# Patient Record
Sex: FEMALE | Race: WHITE | Employment: FULL TIME | URBAN - METROPOLITAN AREA
[De-identification: names, ages, dates, MRNs, and addresses within clinical notes are randomized per-mention and may not be internally consistent; named-entity substitution may affect disease eponyms.]

---

## 2020-10-16 ENCOUNTER — APPOINTMENT (OUTPATIENT)
Dept: GENERAL RADIOLOGY | Age: 50
DRG: 512 | End: 2020-10-16
Payer: COMMERCIAL

## 2020-10-16 ENCOUNTER — HOSPITAL ENCOUNTER (INPATIENT)
Age: 50
LOS: 3 days | Discharge: HOME | DRG: 512 | End: 2020-10-19
Attending: EMERGENCY MEDICINE | Admitting: ORTHOPAEDIC SURGERY
Payer: COMMERCIAL

## 2020-10-16 PROBLEM — S52.201A FOREARM FRACTURES, BOTH BONES, CLOSED, RIGHT, INITIAL ENCOUNTER: Status: ACTIVE | Noted: 2020-10-16

## 2020-10-16 PROBLEM — S52.91XA FOREARM FRACTURES, BOTH BONES, CLOSED, RIGHT, INITIAL ENCOUNTER: Status: ACTIVE | Noted: 2020-10-16

## 2020-10-16 LAB
ACETAMINOPHEN LEVEL: <5 MCG/ML (ref 10–30)
AMPHETAMINE SCREEN, URINE: NOT DETECTED
BARBITURATE SCREEN URINE: NOT DETECTED
BASOPHILS ABSOLUTE: 0.04 E9/L (ref 0–0.2)
BASOPHILS RELATIVE PERCENT: 0.3 % (ref 0–2)
BENZODIAZEPINE SCREEN, URINE: NOT DETECTED
CANNABINOID SCREEN URINE: POSITIVE
COCAINE METABOLITE SCREEN URINE: NOT DETECTED
EOSINOPHILS ABSOLUTE: 0.07 E9/L (ref 0.05–0.5)
EOSINOPHILS RELATIVE PERCENT: 0.6 % (ref 0–6)
ETHANOL: 255 MG/DL (ref 0–0.08)
FENTANYL SCREEN, URINE: NOT DETECTED
HCT VFR BLD CALC: 42.9 % (ref 34–48)
HEMOGLOBIN: 14.3 G/DL (ref 11.5–15.5)
IMMATURE GRANULOCYTES #: 0.05 E9/L
IMMATURE GRANULOCYTES %: 0.4 % (ref 0–5)
LYMPHOCYTES ABSOLUTE: 2.01 E9/L (ref 1.5–4)
LYMPHOCYTES RELATIVE PERCENT: 16 % (ref 20–42)
Lab: ABNORMAL
MCH RBC QN AUTO: 28.9 PG (ref 26–35)
MCHC RBC AUTO-ENTMCNC: 33.3 % (ref 32–34.5)
MCV RBC AUTO: 86.8 FL (ref 80–99.9)
METHADONE SCREEN, URINE: NOT DETECTED
MONOCYTES ABSOLUTE: 0.67 E9/L (ref 0.1–0.95)
MONOCYTES RELATIVE PERCENT: 5.3 % (ref 2–12)
NEUTROPHILS ABSOLUTE: 9.74 E9/L (ref 1.8–7.3)
NEUTROPHILS RELATIVE PERCENT: 77.4 % (ref 43–80)
OPIATE SCREEN URINE: NOT DETECTED
OXYCODONE URINE: NOT DETECTED
PDW BLD-RTO: 12.9 FL (ref 11.5–15)
PHENCYCLIDINE SCREEN URINE: NOT DETECTED
PLATELET # BLD: 296 E9/L (ref 130–450)
PMV BLD AUTO: 10 FL (ref 7–12)
RBC # BLD: 4.94 E12/L (ref 3.5–5.5)
REASON FOR REJECTION: NORMAL
REJECTED TEST: NORMAL
SALICYLATE, SERUM: <0.3 MG/DL (ref 0–30)
TRICYCLIC ANTIDEPRESSANTS SCREEN SERUM: NEGATIVE NG/ML
WBC # BLD: 12.6 E9/L (ref 4.5–11.5)

## 2020-10-16 PROCEDURE — G0480 DRUG TEST DEF 1-7 CLASSES: HCPCS

## 2020-10-16 PROCEDURE — 1200000000 HC SEMI PRIVATE

## 2020-10-16 PROCEDURE — 99283 EMERGENCY DEPT VISIT LOW MDM: CPT

## 2020-10-16 PROCEDURE — 73090 X-RAY EXAM OF FOREARM: CPT

## 2020-10-16 PROCEDURE — 99222 1ST HOSP IP/OBS MODERATE 55: CPT | Performed by: ORTHOPAEDIC SURGERY

## 2020-10-16 PROCEDURE — 2580000003 HC RX 258: Performed by: EMERGENCY MEDICINE

## 2020-10-16 PROCEDURE — 71045 X-RAY EXAM CHEST 1 VIEW: CPT

## 2020-10-16 PROCEDURE — 85025 COMPLETE CBC W/AUTO DIFF WBC: CPT

## 2020-10-16 PROCEDURE — 80307 DRUG TEST PRSMV CHEM ANLYZR: CPT

## 2020-10-16 RX ORDER — 0.9 % SODIUM CHLORIDE 0.9 %
1000 INTRAVENOUS SOLUTION INTRAVENOUS ONCE
Status: COMPLETED | OUTPATIENT
Start: 2020-10-16 | End: 2020-10-17

## 2020-10-16 RX ADMIN — SODIUM CHLORIDE 1000 ML: 9 INJECTION, SOLUTION INTRAVENOUS at 21:48

## 2020-10-16 ASSESSMENT — PAIN DESCRIPTION - LOCATION: LOCATION: FINGER (COMMENT WHICH ONE)

## 2020-10-16 ASSESSMENT — PAIN DESCRIPTION - DESCRIPTORS: DESCRIPTORS: ACHING

## 2020-10-16 ASSESSMENT — PAIN DESCRIPTION - ORIENTATION: ORIENTATION: LEFT

## 2020-10-16 ASSESSMENT — PAIN SCALES - GENERAL: PAINLEVEL_OUTOF10: 6

## 2020-10-17 ENCOUNTER — APPOINTMENT (OUTPATIENT)
Dept: CT IMAGING | Age: 50
DRG: 512 | End: 2020-10-17
Payer: COMMERCIAL

## 2020-10-17 ENCOUNTER — APPOINTMENT (OUTPATIENT)
Dept: GENERAL RADIOLOGY | Age: 50
DRG: 512 | End: 2020-10-17
Payer: COMMERCIAL

## 2020-10-17 ENCOUNTER — ANESTHESIA (OUTPATIENT)
Dept: OPERATING ROOM | Age: 50
DRG: 512 | End: 2020-10-17
Payer: COMMERCIAL

## 2020-10-17 ENCOUNTER — ANESTHESIA EVENT (OUTPATIENT)
Dept: OPERATING ROOM | Age: 50
DRG: 512 | End: 2020-10-17
Payer: COMMERCIAL

## 2020-10-17 VITALS — TEMPERATURE: 97.5 F | DIASTOLIC BLOOD PRESSURE: 63 MMHG | SYSTOLIC BLOOD PRESSURE: 114 MMHG | OXYGEN SATURATION: 100 %

## 2020-10-17 LAB
ABO/RH: NORMAL
ALBUMIN SERPL-MCNC: 4.5 G/DL (ref 3.5–5.2)
ALP BLD-CCNC: 32 U/L (ref 35–104)
ALT SERPL-CCNC: 12 U/L (ref 0–32)
ANION GAP SERPL CALCULATED.3IONS-SCNC: 14 MMOL/L (ref 7–16)
ANTIBODY SCREEN: NORMAL
APTT: 29.4 SEC (ref 24.5–35.1)
AST SERPL-CCNC: 19 U/L (ref 0–31)
BILIRUB SERPL-MCNC: <0.2 MG/DL (ref 0–1.2)
BUN BLDV-MCNC: 12 MG/DL (ref 6–20)
CALCIUM SERPL-MCNC: 8.3 MG/DL (ref 8.6–10.2)
CHLORIDE BLD-SCNC: 110 MMOL/L (ref 98–107)
CO2: 20 MMOL/L (ref 22–29)
CREAT SERPL-MCNC: 0.6 MG/DL (ref 0.5–1)
EKG ATRIAL RATE: 69 BPM
EKG P AXIS: 72 DEGREES
EKG P-R INTERVAL: 154 MS
EKG Q-T INTERVAL: 434 MS
EKG QRS DURATION: 84 MS
EKG QTC CALCULATION (BAZETT): 465 MS
EKG R AXIS: 59 DEGREES
EKG T AXIS: 66 DEGREES
EKG VENTRICULAR RATE: 69 BPM
GFR AFRICAN AMERICAN: >60
GFR NON-AFRICAN AMERICAN: >60 ML/MIN/1.73
GLUCOSE BLD-MCNC: 102 MG/DL (ref 74–99)
HCT VFR BLD CALC: 38.6 % (ref 34–48)
HEMOGLOBIN: 12.8 G/DL (ref 11.5–15.5)
INR BLD: 1.1
POTASSIUM SERPL-SCNC: 3.5 MMOL/L (ref 3.5–5)
PROTHROMBIN TIME: 11.8 SEC (ref 9.3–12.4)
SODIUM BLD-SCNC: 144 MMOL/L (ref 132–146)
TOTAL PROTEIN: 7 G/DL (ref 6.4–8.3)

## 2020-10-17 PROCEDURE — 93005 ELECTROCARDIOGRAM TRACING: CPT | Performed by: STUDENT IN AN ORGANIZED HEALTH CARE EDUCATION/TRAINING PROGRAM

## 2020-10-17 PROCEDURE — 71045 X-RAY EXAM CHEST 1 VIEW: CPT

## 2020-10-17 PROCEDURE — 6360000002 HC RX W HCPCS: Performed by: NURSE ANESTHETIST, CERTIFIED REGISTERED

## 2020-10-17 PROCEDURE — 73090 X-RAY EXAM OF FOREARM: CPT

## 2020-10-17 PROCEDURE — 6360000002 HC RX W HCPCS: Performed by: FAMILY MEDICINE

## 2020-10-17 PROCEDURE — 86900 BLOOD TYPING SEROLOGIC ABO: CPT

## 2020-10-17 PROCEDURE — C1713 ANCHOR/SCREW BN/BN,TIS/BN: HCPCS | Performed by: ORTHOPAEDIC SURGERY

## 2020-10-17 PROCEDURE — 93010 ELECTROCARDIOGRAM REPORT: CPT | Performed by: INTERNAL MEDICINE

## 2020-10-17 PROCEDURE — 3600000004 HC SURGERY LEVEL 4 BASE: Performed by: ORTHOPAEDIC SURGERY

## 2020-10-17 PROCEDURE — 85014 HEMATOCRIT: CPT

## 2020-10-17 PROCEDURE — 36415 COLL VENOUS BLD VENIPUNCTURE: CPT

## 2020-10-17 PROCEDURE — 0PSH04Z REPOSITION RIGHT RADIUS WITH INTERNAL FIXATION DEVICE, OPEN APPROACH: ICD-10-PCS | Performed by: ORTHOPAEDIC SURGERY

## 2020-10-17 PROCEDURE — 3700000001 HC ADD 15 MINUTES (ANESTHESIA): Performed by: ORTHOPAEDIC SURGERY

## 2020-10-17 PROCEDURE — 2709999900 HC NON-CHARGEABLE SUPPLY: Performed by: ORTHOPAEDIC SURGERY

## 2020-10-17 PROCEDURE — 86901 BLOOD TYPING SEROLOGIC RH(D): CPT

## 2020-10-17 PROCEDURE — 2580000003 HC RX 258: Performed by: STUDENT IN AN ORGANIZED HEALTH CARE EDUCATION/TRAINING PROGRAM

## 2020-10-17 PROCEDURE — 72125 CT NECK SPINE W/O DYE: CPT

## 2020-10-17 PROCEDURE — 85610 PROTHROMBIN TIME: CPT

## 2020-10-17 PROCEDURE — 0PSK04Z REPOSITION RIGHT ULNA WITH INTERNAL FIXATION DEVICE, OPEN APPROACH: ICD-10-PCS | Performed by: ORTHOPAEDIC SURGERY

## 2020-10-17 PROCEDURE — 80053 COMPREHEN METABOLIC PANEL: CPT

## 2020-10-17 PROCEDURE — 7100000000 HC PACU RECOVERY - FIRST 15 MIN: Performed by: ORTHOPAEDIC SURGERY

## 2020-10-17 PROCEDURE — 6360000002 HC RX W HCPCS

## 2020-10-17 PROCEDURE — 3700000000 HC ANESTHESIA ATTENDED CARE: Performed by: ORTHOPAEDIC SURGERY

## 2020-10-17 PROCEDURE — 3209999900 FLUORO FOR SURGICAL PROCEDURES

## 2020-10-17 PROCEDURE — 86850 RBC ANTIBODY SCREEN: CPT

## 2020-10-17 PROCEDURE — 6360000002 HC RX W HCPCS: Performed by: ANESTHESIOLOGY

## 2020-10-17 PROCEDURE — 2720000010 HC SURG SUPPLY STERILE: Performed by: ORTHOPAEDIC SURGERY

## 2020-10-17 PROCEDURE — 70450 CT HEAD/BRAIN W/O DYE: CPT

## 2020-10-17 PROCEDURE — 85018 HEMOGLOBIN: CPT

## 2020-10-17 PROCEDURE — 6360000002 HC RX W HCPCS: Performed by: STUDENT IN AN ORGANIZED HEALTH CARE EDUCATION/TRAINING PROGRAM

## 2020-10-17 PROCEDURE — 2580000003 HC RX 258

## 2020-10-17 PROCEDURE — 7100000001 HC PACU RECOVERY - ADDTL 15 MIN: Performed by: ORTHOPAEDIC SURGERY

## 2020-10-17 PROCEDURE — 6370000000 HC RX 637 (ALT 250 FOR IP): Performed by: STUDENT IN AN ORGANIZED HEALTH CARE EDUCATION/TRAINING PROGRAM

## 2020-10-17 PROCEDURE — 2500000003 HC RX 250 WO HCPCS

## 2020-10-17 PROCEDURE — 3600000014 HC SURGERY LEVEL 4 ADDTL 15MIN: Performed by: ORTHOPAEDIC SURGERY

## 2020-10-17 PROCEDURE — 25575 OPTX RDL&ULN SHFT FX RDS&ULN: CPT | Performed by: ORTHOPAEDIC SURGERY

## 2020-10-17 PROCEDURE — 1200000000 HC SEMI PRIVATE

## 2020-10-17 PROCEDURE — 6360000002 HC RX W HCPCS: Performed by: EMERGENCY MEDICINE

## 2020-10-17 PROCEDURE — 85730 THROMBOPLASTIN TIME PARTIAL: CPT

## 2020-10-17 PROCEDURE — 6370000000 HC RX 637 (ALT 250 FOR IP): Performed by: ORTHOPAEDIC SURGERY

## 2020-10-17 DEVICE — SCREW BNE L14MM DIA2.7MM CORT S STL ST T8 STARDRV RECESS: Type: IMPLANTABLE DEVICE | Site: ULNA | Status: FUNCTIONAL

## 2020-10-17 DEVICE — SCREW BNE L16MM DIA2.7MM CORT S STL ST T8 STARDRV RECESS: Type: IMPLANTABLE DEVICE | Site: ULNA | Status: FUNCTIONAL

## 2020-10-17 DEVICE — PLATE BNE L97MM 12 H BILAT S STL LOK COMPR LO PROF FOR 2MM: Type: IMPLANTABLE DEVICE | Site: ULNA | Status: FUNCTIONAL

## 2020-10-17 DEVICE — SCREW BNE L14MM DIA3.5MM CORT S STL ST NONCANNULATED LOK: Type: IMPLANTABLE DEVICE | Site: RADIUS | Status: FUNCTIONAL

## 2020-10-17 DEVICE — SCREW BNE L14MM DIA2MM CORT S STL ST LOK FULL THRD T6: Type: IMPLANTABLE DEVICE | Site: ULNA | Status: FUNCTIONAL

## 2020-10-17 DEVICE — SCREW BNE L16MM DIA3.5MM CORT S STL ST NONCANNULATED LOK: Type: IMPLANTABLE DEVICE | Site: RADIUS | Status: FUNCTIONAL

## 2020-10-17 DEVICE — SCREW BNE L18MM DIA2MM CORT S STL ST LOK FULL THRD T6: Type: IMPLANTABLE DEVICE | Site: ULNA | Status: FUNCTIONAL

## 2020-10-17 DEVICE — PLATE BNE L137MM THK3.4MM 10 H BILAT S STL STR LOK COMPR: Type: IMPLANTABLE DEVICE | Site: RADIUS | Status: FUNCTIONAL

## 2020-10-17 DEVICE — SCREW BNE L20MM DIA3.5MM CORT S STL ST NONCANNULATED LOK: Type: IMPLANTABLE DEVICE | Site: RADIUS | Status: FUNCTIONAL

## 2020-10-17 DEVICE — SCREW CORTX SLFTP W/ 2.0X12MM: Type: IMPLANTABLE DEVICE | Status: FUNCTIONAL

## 2020-10-17 RX ORDER — MORPHINE SULFATE 2 MG/ML
1 INJECTION, SOLUTION INTRAMUSCULAR; INTRAVENOUS EVERY 5 MIN PRN
Status: DISCONTINUED | OUTPATIENT
Start: 2020-10-17 | End: 2020-10-17

## 2020-10-17 RX ORDER — DIAPER,BRIEF,INFANT-TODD,DISP
EACH MISCELLANEOUS PRN
Status: DISCONTINUED | OUTPATIENT
Start: 2020-10-17 | End: 2020-10-17 | Stop reason: ALTCHOICE

## 2020-10-17 RX ORDER — KETOROLAC TROMETHAMINE 30 MG/ML
INJECTION, SOLUTION INTRAMUSCULAR; INTRAVENOUS PRN
Status: DISCONTINUED | OUTPATIENT
Start: 2020-10-17 | End: 2020-10-17 | Stop reason: SDUPTHER

## 2020-10-17 RX ORDER — ONDANSETRON 2 MG/ML
4 INJECTION INTRAMUSCULAR; INTRAVENOUS EVERY 6 HOURS PRN
Status: DISCONTINUED | OUTPATIENT
Start: 2020-10-17 | End: 2020-10-19 | Stop reason: HOSPADM

## 2020-10-17 RX ORDER — ROCURONIUM BROMIDE 10 MG/ML
INJECTION, SOLUTION INTRAVENOUS PRN
Status: DISCONTINUED | OUTPATIENT
Start: 2020-10-17 | End: 2020-10-17 | Stop reason: SDUPTHER

## 2020-10-17 RX ORDER — LABETALOL HYDROCHLORIDE 5 MG/ML
INJECTION, SOLUTION INTRAVENOUS PRN
Status: DISCONTINUED | OUTPATIENT
Start: 2020-10-17 | End: 2020-10-17 | Stop reason: SDUPTHER

## 2020-10-17 RX ORDER — SODIUM CHLORIDE 9 MG/ML
INJECTION, SOLUTION INTRAVENOUS CONTINUOUS
Status: DISCONTINUED | OUTPATIENT
Start: 2020-10-17 | End: 2020-10-19 | Stop reason: HOSPADM

## 2020-10-17 RX ORDER — MORPHINE SULFATE 2 MG/ML
2 INJECTION, SOLUTION INTRAMUSCULAR; INTRAVENOUS ONCE
Status: COMPLETED | OUTPATIENT
Start: 2020-10-17 | End: 2020-10-17

## 2020-10-17 RX ORDER — SODIUM CHLORIDE, SODIUM LACTATE, POTASSIUM CHLORIDE, CALCIUM CHLORIDE 600; 310; 30; 20 MG/100ML; MG/100ML; MG/100ML; MG/100ML
INJECTION, SOLUTION INTRAVENOUS CONTINUOUS PRN
Status: DISCONTINUED | OUTPATIENT
Start: 2020-10-17 | End: 2020-10-17 | Stop reason: SDUPTHER

## 2020-10-17 RX ORDER — BUPRENORPHINE AND NALOXONE 8; 2 MG/1; MG/1
1 FILM, SOLUBLE BUCCAL; SUBLINGUAL 2 TIMES DAILY
Status: DISCONTINUED | OUTPATIENT
Start: 2020-10-17 | End: 2020-10-17 | Stop reason: ALTCHOICE

## 2020-10-17 RX ORDER — SODIUM CHLORIDE 9 MG/ML
INJECTION, SOLUTION INTRAVENOUS CONTINUOUS PRN
Status: DISCONTINUED | OUTPATIENT
Start: 2020-10-17 | End: 2020-10-17 | Stop reason: SDUPTHER

## 2020-10-17 RX ORDER — SODIUM CHLORIDE 0.9 % (FLUSH) 0.9 %
10 SYRINGE (ML) INJECTION PRN
Status: DISCONTINUED | OUTPATIENT
Start: 2020-10-17 | End: 2020-10-19 | Stop reason: HOSPADM

## 2020-10-17 RX ORDER — POLYETHYLENE GLYCOL 3350 17 G/17G
17 POWDER, FOR SOLUTION ORAL DAILY PRN
Status: DISCONTINUED | OUTPATIENT
Start: 2020-10-17 | End: 2020-10-19 | Stop reason: HOSPADM

## 2020-10-17 RX ORDER — MORPHINE SULFATE 2 MG/ML
2 INJECTION, SOLUTION INTRAMUSCULAR; INTRAVENOUS EVERY 4 HOURS PRN
Status: DISCONTINUED | OUTPATIENT
Start: 2020-10-17 | End: 2020-10-17 | Stop reason: SDUPTHER

## 2020-10-17 RX ORDER — MORPHINE SULFATE 2 MG/ML
2 INJECTION, SOLUTION INTRAMUSCULAR; INTRAVENOUS EVERY 4 HOURS PRN
Status: DISCONTINUED | OUTPATIENT
Start: 2020-10-17 | End: 2020-10-19 | Stop reason: HOSPADM

## 2020-10-17 RX ORDER — HYDRALAZINE HYDROCHLORIDE 20 MG/ML
INJECTION INTRAMUSCULAR; INTRAVENOUS PRN
Status: DISCONTINUED | OUTPATIENT
Start: 2020-10-17 | End: 2020-10-17 | Stop reason: SDUPTHER

## 2020-10-17 RX ORDER — LIDOCAINE HYDROCHLORIDE 20 MG/ML
INJECTION, SOLUTION INTRAVENOUS PRN
Status: DISCONTINUED | OUTPATIENT
Start: 2020-10-17 | End: 2020-10-17 | Stop reason: SDUPTHER

## 2020-10-17 RX ORDER — PROPOFOL 10 MG/ML
INJECTION, EMULSION INTRAVENOUS PRN
Status: DISCONTINUED | OUTPATIENT
Start: 2020-10-17 | End: 2020-10-17 | Stop reason: SDUPTHER

## 2020-10-17 RX ORDER — NEOSTIGMINE METHYLSULFATE 1 MG/ML
INJECTION, SOLUTION INTRAVENOUS PRN
Status: DISCONTINUED | OUTPATIENT
Start: 2020-10-17 | End: 2020-10-17 | Stop reason: SDUPTHER

## 2020-10-17 RX ORDER — DEXAMETHASONE SODIUM PHOSPHATE 10 MG/ML
INJECTION, SOLUTION INTRAMUSCULAR; INTRAVENOUS PRN
Status: DISCONTINUED | OUTPATIENT
Start: 2020-10-17 | End: 2020-10-17 | Stop reason: SDUPTHER

## 2020-10-17 RX ORDER — ONDANSETRON 2 MG/ML
INJECTION INTRAMUSCULAR; INTRAVENOUS PRN
Status: DISCONTINUED | OUTPATIENT
Start: 2020-10-17 | End: 2020-10-17 | Stop reason: SDUPTHER

## 2020-10-17 RX ORDER — OXYCODONE HYDROCHLORIDE AND ACETAMINOPHEN 5; 325 MG/1; MG/1
1 TABLET ORAL EVERY 4 HOURS PRN
Status: DISCONTINUED | OUTPATIENT
Start: 2020-10-17 | End: 2020-10-19 | Stop reason: HOSPADM

## 2020-10-17 RX ORDER — MORPHINE SULFATE 4 MG/ML
4 INJECTION, SOLUTION INTRAMUSCULAR; INTRAVENOUS EVERY 4 HOURS PRN
Status: DISCONTINUED | OUTPATIENT
Start: 2020-10-17 | End: 2020-10-19 | Stop reason: HOSPADM

## 2020-10-17 RX ORDER — ACETAMINOPHEN 325 MG/1
650 TABLET ORAL EVERY 6 HOURS
Status: DISCONTINUED | OUTPATIENT
Start: 2020-10-17 | End: 2020-10-19 | Stop reason: HOSPADM

## 2020-10-17 RX ORDER — ONDANSETRON 2 MG/ML
4 INJECTION INTRAMUSCULAR; INTRAVENOUS ONCE
Status: COMPLETED | OUTPATIENT
Start: 2020-10-17 | End: 2020-10-17

## 2020-10-17 RX ORDER — TRAMADOL HYDROCHLORIDE 50 MG/1
50 TABLET ORAL EVERY 6 HOURS PRN
Status: DISCONTINUED | OUTPATIENT
Start: 2020-10-17 | End: 2020-10-17

## 2020-10-17 RX ORDER — MIDAZOLAM HYDROCHLORIDE 1 MG/ML
INJECTION INTRAMUSCULAR; INTRAVENOUS PRN
Status: DISCONTINUED | OUTPATIENT
Start: 2020-10-17 | End: 2020-10-17 | Stop reason: SDUPTHER

## 2020-10-17 RX ORDER — HYDROCODONE BITARTRATE AND ACETAMINOPHEN 5; 325 MG/1; MG/1
1 TABLET ORAL PRN
Status: DISCONTINUED | OUTPATIENT
Start: 2020-10-17 | End: 2020-10-17

## 2020-10-17 RX ORDER — GLYCOPYRROLATE 1 MG/5 ML
SYRINGE (ML) INTRAVENOUS PRN
Status: DISCONTINUED | OUTPATIENT
Start: 2020-10-17 | End: 2020-10-17 | Stop reason: SDUPTHER

## 2020-10-17 RX ORDER — PROMETHAZINE HYDROCHLORIDE 25 MG/ML
6.25 INJECTION, SOLUTION INTRAMUSCULAR; INTRAVENOUS EVERY 10 MIN PRN
Status: DISCONTINUED | OUTPATIENT
Start: 2020-10-17 | End: 2020-10-17

## 2020-10-17 RX ORDER — DIAZEPAM 5 MG/1
10 TABLET ORAL PRN
Status: DISCONTINUED | OUTPATIENT
Start: 2020-10-17 | End: 2020-10-17 | Stop reason: ALTCHOICE

## 2020-10-17 RX ORDER — OXYCODONE HYDROCHLORIDE AND ACETAMINOPHEN 5; 325 MG/1; MG/1
1 TABLET ORAL EVERY 6 HOURS PRN
Qty: 28 TABLET | Refills: 0 | Status: SHIPPED | OUTPATIENT
Start: 2020-10-17 | End: 2020-10-24

## 2020-10-17 RX ORDER — SODIUM CHLORIDE 0.9 % (FLUSH) 0.9 %
10 SYRINGE (ML) INJECTION PRN
Status: DISCONTINUED | OUTPATIENT
Start: 2020-10-17 | End: 2020-10-17 | Stop reason: SDUPTHER

## 2020-10-17 RX ORDER — MORPHINE SULFATE 2 MG/ML
2 INJECTION, SOLUTION INTRAMUSCULAR; INTRAVENOUS EVERY 5 MIN PRN
Status: DISCONTINUED | OUTPATIENT
Start: 2020-10-17 | End: 2020-10-17

## 2020-10-17 RX ORDER — SODIUM CHLORIDE 0.9 % (FLUSH) 0.9 %
10 SYRINGE (ML) INJECTION EVERY 12 HOURS SCHEDULED
Status: DISCONTINUED | OUTPATIENT
Start: 2020-10-17 | End: 2020-10-19 | Stop reason: HOSPADM

## 2020-10-17 RX ORDER — FENTANYL CITRATE 50 UG/ML
INJECTION, SOLUTION INTRAMUSCULAR; INTRAVENOUS PRN
Status: DISCONTINUED | OUTPATIENT
Start: 2020-10-17 | End: 2020-10-17 | Stop reason: SDUPTHER

## 2020-10-17 RX ORDER — HYDROCODONE BITARTRATE AND ACETAMINOPHEN 5; 325 MG/1; MG/1
2 TABLET ORAL PRN
Status: DISCONTINUED | OUTPATIENT
Start: 2020-10-17 | End: 2020-10-17

## 2020-10-17 RX ORDER — MEPERIDINE HYDROCHLORIDE 25 MG/ML
12.5 INJECTION INTRAMUSCULAR; INTRAVENOUS; SUBCUTANEOUS EVERY 5 MIN PRN
Status: DISCONTINUED | OUTPATIENT
Start: 2020-10-17 | End: 2020-10-17

## 2020-10-17 RX ORDER — PROMETHAZINE HYDROCHLORIDE 25 MG/1
12.5 TABLET ORAL EVERY 6 HOURS PRN
Status: DISCONTINUED | OUTPATIENT
Start: 2020-10-17 | End: 2020-10-19 | Stop reason: HOSPADM

## 2020-10-17 RX ORDER — SODIUM CHLORIDE 0.9 % (FLUSH) 0.9 %
10 SYRINGE (ML) INJECTION EVERY 12 HOURS SCHEDULED
Status: DISCONTINUED | OUTPATIENT
Start: 2020-10-17 | End: 2020-10-17 | Stop reason: SDUPTHER

## 2020-10-17 RX ORDER — TRAMADOL HYDROCHLORIDE 50 MG/1
50 TABLET ORAL EVERY 6 HOURS PRN
Status: DISCONTINUED | OUTPATIENT
Start: 2020-10-17 | End: 2020-10-19 | Stop reason: HOSPADM

## 2020-10-17 RX ADMIN — MORPHINE SULFATE 4 MG: 4 INJECTION, SOLUTION INTRAMUSCULAR; INTRAVENOUS at 19:12

## 2020-10-17 RX ADMIN — MORPHINE SULFATE 2 MG: 2 INJECTION, SOLUTION INTRAMUSCULAR; INTRAVENOUS at 08:10

## 2020-10-17 RX ADMIN — SODIUM CHLORIDE, PRESERVATIVE FREE 10 ML: 5 INJECTION INTRAVENOUS at 08:11

## 2020-10-17 RX ADMIN — ONDANSETRON HYDROCHLORIDE 4 MG: 2 INJECTION, SOLUTION INTRAMUSCULAR; INTRAVENOUS at 16:49

## 2020-10-17 RX ADMIN — DEXAMETHASONE SODIUM PHOSPHATE 10 MG: 10 INJECTION, SOLUTION INTRAMUSCULAR; INTRAVENOUS at 15:32

## 2020-10-17 RX ADMIN — ONDANSETRON 4 MG: 2 INJECTION INTRAMUSCULAR; INTRAVENOUS at 00:28

## 2020-10-17 RX ADMIN — FENTANYL CITRATE 50 MCG: 50 INJECTION, SOLUTION INTRAMUSCULAR; INTRAVENOUS at 17:05

## 2020-10-17 RX ADMIN — MORPHINE SULFATE 2 MG: 2 INJECTION, SOLUTION INTRAMUSCULAR; INTRAVENOUS at 00:28

## 2020-10-17 RX ADMIN — ROCURONIUM BROMIDE 10 MG: 10 INJECTION, SOLUTION INTRAVENOUS at 16:06

## 2020-10-17 RX ADMIN — FENTANYL CITRATE 50 MCG: 50 INJECTION, SOLUTION INTRAMUSCULAR; INTRAVENOUS at 16:00

## 2020-10-17 RX ADMIN — FENTANYL CITRATE 100 MCG: 50 INJECTION, SOLUTION INTRAMUSCULAR; INTRAVENOUS at 15:32

## 2020-10-17 RX ADMIN — SODIUM CHLORIDE, POTASSIUM CHLORIDE, SODIUM LACTATE AND CALCIUM CHLORIDE: 600; 310; 30; 20 INJECTION, SOLUTION INTRAVENOUS at 16:31

## 2020-10-17 RX ADMIN — HYDROMORPHONE HYDROCHLORIDE 0.5 MG: 1 INJECTION, SOLUTION INTRAMUSCULAR; INTRAVENOUS; SUBCUTANEOUS at 18:14

## 2020-10-17 RX ADMIN — Medication 2 G: at 15:36

## 2020-10-17 RX ADMIN — KETOROLAC TROMETHAMINE 30 MG: 30 INJECTION, SOLUTION INTRAMUSCULAR; INTRAVENOUS at 17:35

## 2020-10-17 RX ADMIN — OXYCODONE AND ACETAMINOPHEN 1 TABLET: 5; 325 TABLET ORAL at 21:36

## 2020-10-17 RX ADMIN — HYDRALAZINE HYDROCHLORIDE 5 MG: 20 INJECTION INTRAMUSCULAR; INTRAVENOUS at 16:50

## 2020-10-17 RX ADMIN — MORPHINE SULFATE 2 MG: 2 INJECTION, SOLUTION INTRAMUSCULAR; INTRAVENOUS at 12:30

## 2020-10-17 RX ADMIN — ROCURONIUM BROMIDE 40 MG: 10 INJECTION, SOLUTION INTRAVENOUS at 15:32

## 2020-10-17 RX ADMIN — TRAMADOL HYDROCHLORIDE 50 MG: 50 TABLET ORAL at 05:32

## 2020-10-17 RX ADMIN — Medication 0.6 MG: at 16:51

## 2020-10-17 RX ADMIN — SODIUM CHLORIDE: 9 INJECTION, SOLUTION INTRAVENOUS at 16:31

## 2020-10-17 RX ADMIN — SODIUM CHLORIDE: 9 INJECTION, SOLUTION INTRAVENOUS at 13:22

## 2020-10-17 RX ADMIN — FENTANYL CITRATE 50 MCG: 50 INJECTION, SOLUTION INTRAMUSCULAR; INTRAVENOUS at 15:27

## 2020-10-17 RX ADMIN — FENTANYL CITRATE 50 MCG: 50 INJECTION, SOLUTION INTRAMUSCULAR; INTRAVENOUS at 17:25

## 2020-10-17 RX ADMIN — LABETALOL HYDROCHLORIDE 5 MG: 5 INJECTION INTRAVENOUS at 16:52

## 2020-10-17 RX ADMIN — HYDROMORPHONE HYDROCHLORIDE 0.5 MG: 1 INJECTION, SOLUTION INTRAMUSCULAR; INTRAVENOUS; SUBCUTANEOUS at 18:08

## 2020-10-17 RX ADMIN — MIDAZOLAM 2 MG: 1 INJECTION INTRAMUSCULAR; INTRAVENOUS at 15:27

## 2020-10-17 RX ADMIN — Medication 10 ML: at 10:16

## 2020-10-17 RX ADMIN — Medication 3 MG: at 16:51

## 2020-10-17 RX ADMIN — FENTANYL CITRATE 50 MCG: 50 INJECTION, SOLUTION INTRAMUSCULAR; INTRAVENOUS at 16:16

## 2020-10-17 RX ADMIN — SODIUM CHLORIDE: 9 INJECTION, SOLUTION INTRAVENOUS at 15:27

## 2020-10-17 RX ADMIN — PROPOFOL 120 MG: 10 INJECTION, EMULSION INTRAVENOUS at 15:32

## 2020-10-17 RX ADMIN — LIDOCAINE HYDROCHLORIDE 100 MG: 20 INJECTION, SOLUTION INTRAVENOUS at 15:32

## 2020-10-17 RX ADMIN — Medication 2 G: at 23:37

## 2020-10-17 RX ADMIN — ACETAMINOPHEN 650 MG: 325 TABLET, FILM COATED ORAL at 19:12

## 2020-10-17 RX ADMIN — MORPHINE SULFATE 4 MG: 4 INJECTION, SOLUTION INTRAMUSCULAR; INTRAVENOUS at 23:19

## 2020-10-17 RX ADMIN — HYDRALAZINE HYDROCHLORIDE 5 MG: 20 INJECTION INTRAMUSCULAR; INTRAVENOUS at 16:30

## 2020-10-17 ASSESSMENT — PAIN DESCRIPTION - DESCRIPTORS
DESCRIPTORS: ACHING;CONSTANT;DISCOMFORT;THROBBING
DESCRIPTORS: CONSTANT;THROBBING;SHARP
DESCRIPTORS: ACHING;CONSTANT;DISCOMFORT;THROBBING
DESCRIPTORS: CRAMPING;DISCOMFORT
DESCRIPTORS: CONSTANT;SHARP;POUNDING
DESCRIPTORS: CRAMPING;DISCOMFORT

## 2020-10-17 ASSESSMENT — PAIN SCALES - GENERAL
PAINLEVEL_OUTOF10: 0
PAINLEVEL_OUTOF10: 5
PAINLEVEL_OUTOF10: 0
PAINLEVEL_OUTOF10: 10
PAINLEVEL_OUTOF10: 6
PAINLEVEL_OUTOF10: 9
PAINLEVEL_OUTOF10: 10
PAINLEVEL_OUTOF10: 0
PAINLEVEL_OUTOF10: 7
PAINLEVEL_OUTOF10: 9
PAINLEVEL_OUTOF10: 7
PAINLEVEL_OUTOF10: 0
PAINLEVEL_OUTOF10: 9
PAINLEVEL_OUTOF10: 7
PAINLEVEL_OUTOF10: 0
PAINLEVEL_OUTOF10: 0

## 2020-10-17 ASSESSMENT — PULMONARY FUNCTION TESTS
PIF_VALUE: 2
PIF_VALUE: 19
PIF_VALUE: 15
PIF_VALUE: 2
PIF_VALUE: 19
PIF_VALUE: 1
PIF_VALUE: 19
PIF_VALUE: 18
PIF_VALUE: 23
PIF_VALUE: 2
PIF_VALUE: 0
PIF_VALUE: 17
PIF_VALUE: 19
PIF_VALUE: 2
PIF_VALUE: 19
PIF_VALUE: 16
PIF_VALUE: 20
PIF_VALUE: 17
PIF_VALUE: 22
PIF_VALUE: 22
PIF_VALUE: 2
PIF_VALUE: 23
PIF_VALUE: 2
PIF_VALUE: 22
PIF_VALUE: 22
PIF_VALUE: 4
PIF_VALUE: 2
PIF_VALUE: 15
PIF_VALUE: 3
PIF_VALUE: 2
PIF_VALUE: 22
PIF_VALUE: 18
PIF_VALUE: 19
PIF_VALUE: 23
PIF_VALUE: 2
PIF_VALUE: 3
PIF_VALUE: 19
PIF_VALUE: 23
PIF_VALUE: 23
PIF_VALUE: 15
PIF_VALUE: 2
PIF_VALUE: 19
PIF_VALUE: 17
PIF_VALUE: 20
PIF_VALUE: 19
PIF_VALUE: 19
PIF_VALUE: 23
PIF_VALUE: 23
PIF_VALUE: 0
PIF_VALUE: 22
PIF_VALUE: 22
PIF_VALUE: 23
PIF_VALUE: 17
PIF_VALUE: 22
PIF_VALUE: 22
PIF_VALUE: 15
PIF_VALUE: 17
PIF_VALUE: 19
PIF_VALUE: 23
PIF_VALUE: 19
PIF_VALUE: 22
PIF_VALUE: 19
PIF_VALUE: 22
PIF_VALUE: 19
PIF_VALUE: 20
PIF_VALUE: 2
PIF_VALUE: 22
PIF_VALUE: 23
PIF_VALUE: 22
PIF_VALUE: 19
PIF_VALUE: 20
PIF_VALUE: 23
PIF_VALUE: 17
PIF_VALUE: 20
PIF_VALUE: 22
PIF_VALUE: 15
PIF_VALUE: 5
PIF_VALUE: 2
PIF_VALUE: 19
PIF_VALUE: 23
PIF_VALUE: 2
PIF_VALUE: 17
PIF_VALUE: 0
PIF_VALUE: 20
PIF_VALUE: 19
PIF_VALUE: 1
PIF_VALUE: 19
PIF_VALUE: 23
PIF_VALUE: 17
PIF_VALUE: 22
PIF_VALUE: 2
PIF_VALUE: 19
PIF_VALUE: 15
PIF_VALUE: 28
PIF_VALUE: 2
PIF_VALUE: 22
PIF_VALUE: 19
PIF_VALUE: 17
PIF_VALUE: 19
PIF_VALUE: 15
PIF_VALUE: 19
PIF_VALUE: 19
PIF_VALUE: 2
PIF_VALUE: 17
PIF_VALUE: 2
PIF_VALUE: 22
PIF_VALUE: 23
PIF_VALUE: 2
PIF_VALUE: 23
PIF_VALUE: 19
PIF_VALUE: 22
PIF_VALUE: 20
PIF_VALUE: 19
PIF_VALUE: 1
PIF_VALUE: 19
PIF_VALUE: 20
PIF_VALUE: 23
PIF_VALUE: 19
PIF_VALUE: 21
PIF_VALUE: 19
PIF_VALUE: 20
PIF_VALUE: 19
PIF_VALUE: 2
PIF_VALUE: 23
PIF_VALUE: 2
PIF_VALUE: 2
PIF_VALUE: 20
PIF_VALUE: 2
PIF_VALUE: 17
PIF_VALUE: 22
PIF_VALUE: 19
PIF_VALUE: 19
PIF_VALUE: 0

## 2020-10-17 ASSESSMENT — PAIN DESCRIPTION - PAIN TYPE
TYPE: ACUTE PAIN
TYPE: SURGICAL PAIN
TYPE: ACUTE PAIN
TYPE: SURGICAL PAIN

## 2020-10-17 ASSESSMENT — PAIN DESCRIPTION - FREQUENCY
FREQUENCY: CONTINUOUS

## 2020-10-17 ASSESSMENT — PAIN DESCRIPTION - ONSET
ONSET: ON-GOING
ONSET: ON-GOING

## 2020-10-17 ASSESSMENT — PAIN DESCRIPTION - ORIENTATION
ORIENTATION: RIGHT

## 2020-10-17 ASSESSMENT — PAIN DESCRIPTION - LOCATION
LOCATION: WRIST;ARM
LOCATION: ARM
LOCATION: WRIST;ARM
LOCATION: ARM

## 2020-10-17 ASSESSMENT — PAIN - FUNCTIONAL ASSESSMENT
PAIN_FUNCTIONAL_ASSESSMENT: PREVENTS OR INTERFERES SOME ACTIVE ACTIVITIES AND ADLS
PAIN_FUNCTIONAL_ASSESSMENT: PREVENTS OR INTERFERES WITH ALL ACTIVE AND SOME PASSIVE ACTIVITIES
PAIN_FUNCTIONAL_ASSESSMENT: PREVENTS OR INTERFERES SOME ACTIVE ACTIVITIES AND ADLS

## 2020-10-17 ASSESSMENT — PAIN DESCRIPTION - PROGRESSION: CLINICAL_PROGRESSION: NOT CHANGED

## 2020-10-17 NOTE — ANESTHESIA PRE PROCEDURE
mL/hr at 10/17/20 1322         Allergies:  No Known Allergies    Problem List:    Patient Active Problem List   Diagnosis Code    Forearm fractures, both bones, closed, right, initial encounter S52.91XA, S52.201A       Past Medical History:        Diagnosis Date    Arthritis     Bulging lumbar disc     Restless leg syndrome     Stress fracture        Past Surgical History:        Procedure Laterality Date    FOOT SURGERY      HYSTERECTOMY      LEEP      TONSILLECTOMY      TUBAL LIGATION         Social History:    Social History     Tobacco Use    Smoking status: Current Every Day Smoker     Packs/day: 1.00    Smokeless tobacco: Never Used   Substance Use Topics    Alcohol use: No                                Ready to quit: Not Answered  Counseling given: Not Answered      Vital Signs (Current):   Vitals:    10/17/20 0030 10/17/20 0114 10/17/20 0810 10/17/20 1015   BP:  134/75 130/70 138/76   Pulse:  70 70 68   Resp: 16 20 18 18   Temp: 36.6 °C (97.8 °F) 36.6 °C (97.8 °F) 36.7 °C (98.1 °F) 36.5 °C (97.7 °F)   TempSrc: Oral Temporal Temporal Temporal   SpO2: 96% 96% 96% 98%   Weight:  145 lb (65.8 kg)     Height:  5' 5\" (1.651 m)                                                BP Readings from Last 3 Encounters:   10/17/20 138/76   10/13/17 110/80   08/16/17 (!) 160/100       NPO Status:  > 8 hours                                                                               BMI:   Wt Readings from Last 3 Encounters:   10/17/20 145 lb (65.8 kg)   08/16/17 135 lb (61.2 kg)   12/22/16 140 lb (63.5 kg)     Body mass index is 24.13 kg/m².     CBC:   Lab Results   Component Value Date    WBC 12.6 10/16/2020    RBC 4.94 10/16/2020    HGB 12.8 10/17/2020    HCT 38.6 10/17/2020    MCV 86.8 10/16/2020    RDW 12.9 10/16/2020     10/16/2020       CMP:   Lab Results   Component Value Date     10/17/2020    K 3.5 10/17/2020     10/17/2020    CO2 20 10/17/2020    BUN 12 10/17/2020    CREATININE 0.6 10/17/2020    GFRAA >60 10/17/2020    LABGLOM >60 10/17/2020    GLUCOSE 102 10/17/2020    PROT 7.0 10/17/2020    CALCIUM 8.3 10/17/2020    BILITOT <0.2 10/17/2020    ALKPHOS 32 10/17/2020    AST 19 10/17/2020    ALT 12 10/17/2020       POC Tests: No results for input(s): POCGLU, POCNA, POCK, POCCL, POCBUN, POCHEMO, POCHCT in the last 72 hours. Coags:   Lab Results   Component Value Date    PROTIME 11.8 10/17/2020    INR 1.1 10/17/2020    APTT 29.4 10/17/2020       HCG (If Applicable):   Lab Results   Component Value Date    PREGTESTUR neg 03/13/2016        ABGs: No results found for: PHART, PO2ART, GQY5RVQ, TAN2KYK, BEART, K5TFLOYK     Type & Screen (If Applicable):  No results found for: LABABO, LABRH    Drug/Infectious Status (If Applicable):  No results found for: HIV, HEPCAB    COVID-19 Screening (If Applicable): No results found for: COVID19     EKG 10/17/2020  Narrative & Impression   Normal sinus rhythm  Normal ECG  When compared with ECG of 13-OCT-2017 15:50,  No significant change was found       Anesthesia Evaluation  Patient summary reviewed and Nursing notes reviewed no history of anesthetic complications:   Airway: Mallampati: II  TM distance: >3 FB   Neck ROM: full  Mouth opening: > = 3 FB Dental:      Comment: Pt denies loose or chipped teeth    Pulmonary:normal exam  breath sounds clear to auscultation                            ROS comment: Vapes occasionally   Cardiovascular:Negative CV ROS          ECG reviewed  Rhythm: regular  Rate: normal           Beta Blocker:  Not on Beta Blocker         Neuro/Psych:                ROS comment: Restless leg syndrome  GI/Hepatic/Renal: Neg GI/Hepatic/Renal ROS            Endo/Other:    (+) : arthritis:., .                 Abdominal:           Vascular: negative vascular ROS. Anesthesia Plan      general     ASA 2     (#20 Left Hand)  Induction: intravenous.   BIS  MIPS: Postoperative opioids intended and Prophylactic antiemetics administered. Anesthetic plan and risks discussed with patient. Use of blood products discussed with patient whom consented to blood products. Plan discussed with CRNA and attending. Otoniel Luna RN   10/17/2020      Pt seen, examined, chart reviewed, plan discussed.   Tha Lr  10/17/2020  3:39 PM

## 2020-10-17 NOTE — PROGRESS NOTES
Date: 10/17/2020       Patient Name: Evita Hahn  : 1970      MRN: [id number]    PT order received and chart reviewed. PT evaluation held due to OR.   Will follow up as appropriate    Lear Eriberto PT, DPT  [id number]

## 2020-10-17 NOTE — ANESTHESIA POSTPROCEDURE EVALUATION
Department of Anesthesiology  Postprocedure Note    Patient: Harlan Glover  MRN: [id number]  YOB: 1970  Date of evaluation: 10/17/2020  Time:  6:34 PM     Procedure Summary     Date:  10/17/20 Room / Location:  [place] [address] / [facility]    Anesthesia Start:  1527 Anesthesia Stop:      Procedure:  FOREARM OPEN REDUCTION INTERNAL FIXATION (Right ) Diagnosis:  (fracture ulna and radius)    Surgeon:  Charlette Estrada MD Responsible Provider:  Alexandr Murillo MD    Anesthesia Type:  general ASA Status:  2          Anesthesia Type: general    Marlon Phase I: Marlon Score: 8    Marlon Phase II:      Last vitals: Reviewed and per EMR flowsheets.        Anesthesia Post Evaluation    Patient location during evaluation: PACU  Patient participation: complete - patient participated  Level of consciousness: awake  Pain score: 3  Airway patency: patent  Nausea & Vomiting: no nausea and no vomiting  Complications: no  Cardiovascular status: blood pressure returned to baseline  Respiratory status: acceptable  Hydration status: euvolemic
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
## 2020-10-17 NOTE — PROGRESS NOTES
Floor Called, nurse to nurse given. Spoke with [employer] . Patients test results review, VS reported to receiving nurse. Any and all important information regarding patient disclosed. Patient transferred to floor on bed on 3lnc in stable condition with ancillary staff.

## 2020-10-17 NOTE — PROGRESS NOTES
Occupational Therapy          OT consult received and appreciated. Chart reviewed. Will hold evaluation due to OR scheduled today for R forearm ORIF . Will evaluate at a later time post op as indicated. Thank you.  Christi Hernandez, OTR/L #[id number]

## 2020-10-17 NOTE — PROGRESS NOTES
Dr. Te Long notified patient is in extreme pain and it is ok to use dilaudid as first line for pain control.
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
## 2020-10-17 NOTE — PROGRESS NOTES
Department of Orthopedic Surgery  Resident Progress Note    Patient seen and examined. Pain controlled. No new complaints. Answered all questions about surgery and post-operative plan.  Pt voiced understanding    VITALS:  /75   Pulse 70   Temp 97.8 °F (36.6 °C) (Temporal)   Resp 20   Ht 5' 5\" (1.651 m)   Wt 145 lb (65.8 kg)   LMP 06/10/2013   SpO2 96%   BMI 24.13 kg/m²     General: alert and oriented to person, place and time, well-developed and well-nourished, in no acute distress    MUSCULOSKELETAL:   right upper extremity:  · Splint C/D/I and well fitting  · Compartments soft and compressible  · +AIN/PIN/Ulnar/Median/Radial nerve function intact grossly  · +2/4 Radial pulse, Cap refill <2 sec  · Distal sensory grossly intact C4-T1 dermatomes    CBC:   Lab Results   Component Value Date    WBC 12.6 10/16/2020    HGB 14.3 10/16/2020    HCT 42.9 10/16/2020     10/16/2020     PT/INR:  No results found for: PROTIME, INR      ASSESSMENT  · R BBFF     PLAN      · Continue physical therapy and protocol: KAILEY QURESHI  · NPO  · Plan for operative intervention today  · Deep venous thrombosis prophylaxis - SCDs, early mobilization  · PT/OT  · Pain Control: IV and PO  · Monitor H&H

## 2020-10-18 PROCEDURE — 97165 OT EVAL LOW COMPLEX 30 MIN: CPT

## 2020-10-18 PROCEDURE — 6360000002 HC RX W HCPCS: Performed by: STUDENT IN AN ORGANIZED HEALTH CARE EDUCATION/TRAINING PROGRAM

## 2020-10-18 PROCEDURE — 97530 THERAPEUTIC ACTIVITIES: CPT

## 2020-10-18 PROCEDURE — 6370000000 HC RX 637 (ALT 250 FOR IP): Performed by: STUDENT IN AN ORGANIZED HEALTH CARE EDUCATION/TRAINING PROGRAM

## 2020-10-18 PROCEDURE — 2580000003 HC RX 258: Performed by: STUDENT IN AN ORGANIZED HEALTH CARE EDUCATION/TRAINING PROGRAM

## 2020-10-18 PROCEDURE — 1200000000 HC SEMI PRIVATE

## 2020-10-18 RX ADMIN — OXYCODONE AND ACETAMINOPHEN 1 TABLET: 5; 325 TABLET ORAL at 23:33

## 2020-10-18 RX ADMIN — ACETAMINOPHEN 650 MG: 325 TABLET, FILM COATED ORAL at 01:44

## 2020-10-18 RX ADMIN — OXYCODONE AND ACETAMINOPHEN 1 TABLET: 5; 325 TABLET ORAL at 01:44

## 2020-10-18 RX ADMIN — MORPHINE SULFATE 2 MG: 2 INJECTION, SOLUTION INTRAMUSCULAR; INTRAVENOUS at 08:10

## 2020-10-18 RX ADMIN — Medication 2 G: at 08:10

## 2020-10-18 RX ADMIN — SODIUM CHLORIDE, PRESERVATIVE FREE 10 ML: 5 INJECTION INTRAVENOUS at 21:23

## 2020-10-18 RX ADMIN — ACETAMINOPHEN 650 MG: 325 TABLET, FILM COATED ORAL at 07:02

## 2020-10-18 RX ADMIN — ACETAMINOPHEN 650 MG: 325 TABLET, FILM COATED ORAL at 21:19

## 2020-10-18 RX ADMIN — SODIUM CHLORIDE, PRESERVATIVE FREE 10 ML: 5 INJECTION INTRAVENOUS at 08:10

## 2020-10-18 RX ADMIN — OXYCODONE AND ACETAMINOPHEN 1 TABLET: 5; 325 TABLET ORAL at 06:11

## 2020-10-18 RX ADMIN — Medication 2 G: at 15:26

## 2020-10-18 RX ADMIN — OXYCODONE AND ACETAMINOPHEN 1 TABLET: 5; 325 TABLET ORAL at 13:57

## 2020-10-18 RX ADMIN — MORPHINE SULFATE 4 MG: 4 INJECTION, SOLUTION INTRAMUSCULAR; INTRAVENOUS at 03:26

## 2020-10-18 RX ADMIN — SODIUM CHLORIDE: 9 INJECTION, SOLUTION INTRAVENOUS at 03:27

## 2020-10-18 ASSESSMENT — PAIN DESCRIPTION - DESCRIPTORS
DESCRIPTORS: CONSTANT;THROBBING
DESCRIPTORS: ACHING;DISCOMFORT;DULL
DESCRIPTORS: CONSTANT;THROBBING;STABBING
DESCRIPTORS: ACHING;DISCOMFORT;DULL

## 2020-10-18 ASSESSMENT — PAIN SCALES - GENERAL
PAINLEVEL_OUTOF10: 5
PAINLEVEL_OUTOF10: 4
PAINLEVEL_OUTOF10: 2
PAINLEVEL_OUTOF10: 5
PAINLEVEL_OUTOF10: 8
PAINLEVEL_OUTOF10: 5
PAINLEVEL_OUTOF10: 0
PAINLEVEL_OUTOF10: 6
PAINLEVEL_OUTOF10: 10
PAINLEVEL_OUTOF10: 4

## 2020-10-18 ASSESSMENT — PAIN - FUNCTIONAL ASSESSMENT
PAIN_FUNCTIONAL_ASSESSMENT: PREVENTS OR INTERFERES WITH ALL ACTIVE AND SOME PASSIVE ACTIVITIES
PAIN_FUNCTIONAL_ASSESSMENT: PREVENTS OR INTERFERES SOME ACTIVE ACTIVITIES AND ADLS
PAIN_FUNCTIONAL_ASSESSMENT: PREVENTS OR INTERFERES WITH ALL ACTIVE AND SOME PASSIVE ACTIVITIES
PAIN_FUNCTIONAL_ASSESSMENT: PREVENTS OR INTERFERES SOME ACTIVE ACTIVITIES AND ADLS

## 2020-10-18 ASSESSMENT — PAIN DESCRIPTION - PAIN TYPE
TYPE: SURGICAL PAIN

## 2020-10-18 ASSESSMENT — PAIN DESCRIPTION - PROGRESSION
CLINICAL_PROGRESSION: NOT CHANGED
CLINICAL_PROGRESSION: GRADUALLY WORSENING

## 2020-10-18 ASSESSMENT — PAIN DESCRIPTION - LOCATION
LOCATION: ARM

## 2020-10-18 ASSESSMENT — PAIN DESCRIPTION - ONSET
ONSET: ON-GOING

## 2020-10-18 ASSESSMENT — PAIN DESCRIPTION - FREQUENCY
FREQUENCY: CONTINUOUS

## 2020-10-18 ASSESSMENT — ENCOUNTER SYMPTOMS: SHORTNESS OF BREATH: 0

## 2020-10-18 ASSESSMENT — PAIN DESCRIPTION - ORIENTATION
ORIENTATION: RIGHT
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
## 2020-10-18 NOTE — PROGRESS NOTES
Department of Orthopedic Surgery  Resident Progress Note    Patient seen and examined. Pain controlled. Some mild tingling to thumb. Denies chest pain, shortness of breath, dizziness/lightheadedness. VITALS:  /67   Pulse 53   Temp 97.8 °F (36.6 °C) (Temporal)   Resp 18   Ht 5' 5\" (1.651 m)   Wt 145 lb (65.8 kg)   LMP 06/10/2013   SpO2 96%   BMI 24.13 kg/m²     General: alert and oriented    MUSCULOSKELETAL:   right upper extremity:  · Dressing C/D/I, splint in place  · Compartments soft and compressible  · +AIN/PIN/Ulnar/Median/Radial nerve function intact grossly  · +2/4 Radial pulse, Cap refill <2 sec  · Distal sensory grossly intact C4-T1 dermatomes    CBC:   Lab Results   Component Value Date    WBC 12.6 10/16/2020    HGB 12.8 10/17/2020    HCT 38.6 10/17/2020     10/16/2020     PT/INR:    Lab Results   Component Value Date    PROTIME 11.8 10/17/2020    INR 1.1 10/17/2020       ASSESSMENT  · S/P ORIF R both bone forearm fracture    PLAN    NWb RUE  Splint/dressing loosened, numbness resolved  Ice and elevation to affected extremity, continue to monitor compartments  24hr ABX coverage   DVT prophylaxis- early mobilization   PT/OT  Pain control- IV & PO  Monitor H&H-12.8/38.6  D/C: potentially tomorrow, continue to monitor for compartment syndrome  D/w Dr. Karina Cormier       Continue to follow compartments and possible DC tomorrow when more comfortable.

## 2020-10-19 ENCOUNTER — TELEPHONE (OUTPATIENT)
Dept: ORTHOPEDIC SURGERY | Age: 50
End: 2020-10-19

## 2020-10-19 VITALS
HEIGHT: 65 IN | DIASTOLIC BLOOD PRESSURE: 59 MMHG | OXYGEN SATURATION: 98 % | TEMPERATURE: 98.1 F | RESPIRATION RATE: 16 BRPM | SYSTOLIC BLOOD PRESSURE: 118 MMHG | HEART RATE: 69 BPM | WEIGHT: 145 LBS | BODY MASS INDEX: 24.16 KG/M2

## 2020-10-19 PROCEDURE — 6370000000 HC RX 637 (ALT 250 FOR IP): Performed by: STUDENT IN AN ORGANIZED HEALTH CARE EDUCATION/TRAINING PROGRAM

## 2020-10-19 PROCEDURE — 2580000003 HC RX 258: Performed by: STUDENT IN AN ORGANIZED HEALTH CARE EDUCATION/TRAINING PROGRAM

## 2020-10-19 RX ADMIN — OXYCODONE AND ACETAMINOPHEN 1 TABLET: 5; 325 TABLET ORAL at 10:31

## 2020-10-19 RX ADMIN — ACETAMINOPHEN 650 MG: 325 TABLET, FILM COATED ORAL at 08:54

## 2020-10-19 RX ADMIN — SODIUM CHLORIDE, PRESERVATIVE FREE 10 ML: 5 INJECTION INTRAVENOUS at 08:54

## 2020-10-19 RX ADMIN — OXYCODONE AND ACETAMINOPHEN 1 TABLET: 5; 325 TABLET ORAL at 05:26

## 2020-10-19 ASSESSMENT — PAIN DESCRIPTION - ORIENTATION
ORIENTATION: RIGHT
ORIENTATION: RIGHT

## 2020-10-19 ASSESSMENT — PAIN DESCRIPTION - PROGRESSION
CLINICAL_PROGRESSION: GRADUALLY WORSENING
CLINICAL_PROGRESSION: GRADUALLY WORSENING

## 2020-10-19 ASSESSMENT — PAIN SCALES - GENERAL
PAINLEVEL_OUTOF10: 6
PAINLEVEL_OUTOF10: 2
PAINLEVEL_OUTOF10: 7
PAINLEVEL_OUTOF10: 3
PAINLEVEL_OUTOF10: 6

## 2020-10-19 ASSESSMENT — PAIN DESCRIPTION - PAIN TYPE
TYPE: SURGICAL PAIN
TYPE: SURGICAL PAIN

## 2020-10-19 ASSESSMENT — PAIN DESCRIPTION - LOCATION
LOCATION: ARM
LOCATION: ARM

## 2020-10-19 ASSESSMENT — PAIN DESCRIPTION - DESCRIPTORS
DESCRIPTORS: ACHING;THROBBING
DESCRIPTORS: ACHING;THROBBING

## 2020-10-19 ASSESSMENT — PAIN - FUNCTIONAL ASSESSMENT
PAIN_FUNCTIONAL_ASSESSMENT: PREVENTS OR INTERFERES WITH MANY ACTIVE NOT PASSIVE ACTIVITIES
PAIN_FUNCTIONAL_ASSESSMENT: PREVENTS OR INTERFERES WITH MANY ACTIVE NOT PASSIVE ACTIVITIES

## 2020-10-19 ASSESSMENT — PAIN DESCRIPTION - ONSET
ONSET: ON-GOING
ONSET: AWAKENED FROM SLEEP

## 2020-10-19 ASSESSMENT — PAIN DESCRIPTION - FREQUENCY
FREQUENCY: INTERMITTENT
FREQUENCY: INTERMITTENT

## 2020-10-19 NOTE — PLAN OF CARE
Problem: Falls - Risk of:  Goal: Will remain free from falls  Description: Will remain free from falls  10/19/2020 1105 by Gucci Kang  Outcome: Completed  10/19/2020 1026 by Gucci Kang  Outcome: Met This Shift  10/18/2020 2207 by Fernando Chowdhury RN  Outcome: Met This Shift  Goal: Absence of physical injury  Description: Absence of physical injury  10/19/2020 1105 by Gucci Kang  Outcome: Completed  10/19/2020 1026 by Gucci Kang  Outcome: Met This Shift     Problem: Confusion - Acute:  Goal: Absence of continued neurological deterioration signs and symptoms  Description: Absence of continued neurological deterioration signs and symptoms  10/19/2020 1105 by Gucci Kang  Outcome: Completed  10/19/2020 1026 by Gucci Kang  Outcome: Met This Shift  Goal: Mental status will be restored to baseline  Description: Mental status will be restored to baseline  10/19/2020 1105 by Gucci Kang  Outcome: Completed  10/19/2020 1026 by Gucic Kang  Outcome: Met This Shift     Problem: Discharge Planning:  Goal: Ability to perform activities of daily living will improve  Description: Ability to perform activities of daily living will improve  Outcome: Completed  Goal: Participates in care planning  Description: Participates in care planning  10/19/2020 1105 by Gucci Kang  Outcome: Completed  10/19/2020 1026 by Gucci Kang  Outcome: Met This Shift     Problem: Injury - Risk of, Physical Injury:  Goal: Will remain free from falls  Description: Will remain free from falls  10/19/2020 1105 by Gucci Kang  Outcome: Completed  10/19/2020 1026 by Gucci Kang  Outcome: Met This Shift  10/18/2020 2207 by Fernando Chowdhury RN  Outcome: Met This Shift  Goal: Absence of physical injury  Description: Absence of physical injury  10/19/2020 1105 by Gucci Kang  Outcome: Completed  10/19/2020 1026 by Gucci Kang  Outcome: Met This Shift     Problem: Mood - Altered:  Goal: Mood Will show no infection signs and symptoms  Description: Will show no infection signs and symptoms  Outcome: Completed  Goal: Absence of new skin breakdown  Description: Absence of new skin breakdown  Outcome: Completed

## 2020-10-19 NOTE — CARE COORDINATION
10/19/2020 social work transition of care planning  Pt is from home with family and had been independent. Pt pcp is Dr. Saundra Dave and pharmacy is [facility] in [place]. Explained sw role in transition of care planning. Pt plan is home, will have a ride at discharge. SBI completed with pt.   Electronically signed by RICKIE Teixeira on 10/19/2020 at 9:41 AM

## 2020-10-19 NOTE — TELEPHONE ENCOUNTER
Attempted to call patient to schedule postop appointment. No answer and unable to leave voicemail. appt tentatively scheduled at this time, pending confirmation.     Future Appointments   Date Time Provider Adonis Sunshine   11/2/2020 11:30 AM SCHEDULE, [facility]

## 2020-10-23 NOTE — DISCHARGE SUMMARY
daily dry sterile dressing changes until wound is dry. The pt was allowed to shower on POD 4. The patient was to follow up with Kristeen Claude, MD in 2 weeks, and to call the office for an appointment. suture (s) were to be removed on within 2 weeks. [facility] Medication Instructions SOY:[id number]    Printed on:10/22/20 1917   Medication Information                      ibuprofen (ADVIL;MOTRIN) 800 MG tablet  Take 800 mg by mouth every 6 hours as needed for Pain             oxyCODONE-acetaminophen (PERCOCET) 5-325 MG per tablet  Take 1 tablet by mouth every 6 hours as needed for Pain for up to 7 days.              Temazepam (RESTORIL PO)  Take by mouth as needed                 Signed:  Tomas Kirk  10/22/2020  9:16 PM

## 2020-11-02 ENCOUNTER — OFFICE VISIT (OUTPATIENT)
Dept: ORTHOPEDIC SURGERY | Age: 50
End: 2020-11-02
Payer: COMMERCIAL

## 2020-11-02 ENCOUNTER — HOSPITAL ENCOUNTER (OUTPATIENT)
Dept: GENERAL RADIOLOGY | Age: 50
Discharge: HOME | End: 2020-11-04
Payer: COMMERCIAL

## 2020-11-02 VITALS — DIASTOLIC BLOOD PRESSURE: 77 MMHG | HEART RATE: 71 BPM | TEMPERATURE: 98 F | SYSTOLIC BLOOD PRESSURE: 145 MMHG

## 2020-11-02 PROCEDURE — 99024 POSTOP FOLLOW-UP VISIT: CPT | Performed by: PHYSICIAN ASSISTANT

## 2020-11-02 PROCEDURE — 99212 OFFICE O/P EST SF 10 MIN: CPT | Performed by: PHYSICIAN ASSISTANT

## 2020-11-02 PROCEDURE — 73090 X-RAY EXAM OF FOREARM: CPT

## 2020-11-02 NOTE — PATIENT INSTRUCTIONS
Remove sutures  Steri strips should fall off in the shower at some point, if they do not fall off in 10 days, remove them  Dressing: Can remove dressing in 1-2 days then open to air  Can shower in a couple days, NO Soaking or submerging incision in water until fully healed & all scabs are gone    Recommend minimizing if not completely abstaining from nicotine use     Non-weight bearing on right upper extremity    Soft dressings to right arm for swelling control, support and tactile feedback    Therapy:Therapy orders sent to [facility]     Pain control : Tylenol, Ibuprofen as needed if not managing pain please call office     Continue with ice to the injured extremity 2-3 times per day for swelling  If able continue with elevation and compression    Follow up in 4 weeks with XR of the right forearm      Incision and Scar Care    Once your incision has fully healed and no there is no drainage, swelling, redness, or red streaking, you can begin scar massage. Use Vaseline or lotion to perform scar massage several times per day. Massage your incision line to break up any fibrous adhesions and scar tissue. Only massage areas of skin that have fully healed. Do not pull your scabs off. Let them fall off on their own. How much pressure should I apply? You should apply as much pressure as you can tolerate. Begin with light pressure and progress to deeper and  firmer pressure. Massage lotion in, applying enough pressure to make the scar area lighten in color or turn  white. How often should I massage my scar? Massage should be done two to three times daily for ten minutes each time. How long is massaging necessary? You should massage your scars as instructed for at least six months following your surgery or injury. Massaging for more than six months will not hurt your scars and may actually prove beneficial.  When should I stop massaging?   Stop massaging and contact your doctor if you experience any of the following:   Redness   Bleeding   Scar feels warmer than the skin around it   More pain than usual at the site of the scar    Long Term Scar Management    Apply sunscreen and clothing that covers your scar if it is in direct sunlight. This includes going outside or in a car. Sun protection prevents darkening of the scar. Scars take several months to years to completely heal and remodel.

## 2020-11-02 NOTE — PROGRESS NOTES
Temp 98 °F (36.7 °C) (Oral)   LMP 06/10/2013     XR:   AP/Lateral views of the of the right radius/ulna demonstrates ORIF of BB fractures, alignment well maintained. No evidence of hardware failure or loosening, small amount of interval callus appreciated     Assessment:   Diagnosis Orders   1. Closed fracture of shaft of bone of right forearm with routine healing, subsequent encounter  [address]       Plan:  Reviewed x-rays with patient today in office  Removed sutures  Steri strips should fall off in the shower at some point, if they do not fall off in 10 days, remove them  Dressing: Can remove dressing in 1-2 days then open to air  Can shower in a couple days, NO Soaking or submerging incision in water until fully healed & all scabs are gone    Recommend minimizing if not completely abstaining from nicotine use     Non-weight bearing on right upper extremity    Soft dressings to right arm for swelling control, support and tactile feedback    Therapy:Therapy orders sent to [facility]     Pain control : Tylenol, Ibuprofen as needed if not managing pain please call office     Continue with ice to the injured extremity 2-3 times per day for swelling  If able continue with elevation and compression    Follow up in 4 weeks with XR of the right forearm    Patient given instructions for scar massage and digit ROM prior to starting therapy    Electronically signed by Serenity Bang PA-C on 11/2/2020 at 1:21 PM  Note: This report was completed using computerWAFU voiced recognition software. Every effort has been made to ensure accuracy; however, inadvertent computerized transcription errors may be present.

## 2020-11-02 NOTE — PROGRESS NOTES
Chief Complaint   Patient presents with    Follow Up After Procedure     Patient presents today Right arm in splint and sling. Patient says no pain, just very achy 3/10. OP:SURGEON: Dr. Alvin Steve MD  DATE OF PROCEDURE: 10/17/2020  PROCEDURE: Open reduction internal fixation right both bone forearm fracture    Subjective:  Kody Peace is approximately 2 weeks follow-up from the above surgery. Patient is {Weight Bearin} on that extremity. {He/she (caps):90993} ambulates {With/no:37686} assistive device, {Assistive Devices DME:26910:o}. Pain to extremity is {description:174488} and {IS/IS DO} taking pain medication, {postoppainmed:28542}. They {denies/complains:30227} paresthesias. Patient {Continues/Finished:32910} DVT prophylaxis, {PostOAC:19943}. Patient {IS/IS GSX:09391} participating in therapy, {postop therapy\:00134}. Denies calf pain, CP, SOB, fever, chills. Review of Systems -    General ROS: negative for - chills, fatigue, fever or night sweats  Respiratory ROS: no cough, shortness of breath, or wheezing  Cardiovascular ROS: no chest pain or dyspnea on exertion  Gastrointestinal ROS: no abdominal pain, nausea, vomiting, diarrhea, constipation,or black or bloody stools  Genitourinary: no hematuria, dysuria, or incontinence   Musculoskeletal ROS: negative for -back or neck pain or stiffness, also see HPI  Neurological ROS: no TIA or stroke symptoms       Objective:    General: Alert and oriented X 3, normocephalic atraumatic, external ears and eye normal, sclera clear, no acute distress, respirations easy and unlabored with no audible wheezes, skin warm and dry, speech and dress appropriate for noted age, affect euthymic.     Extremity:  Right Upper Extremity  Skin is clean dry and intact  Mild edema noted  Radial pulse palpable, fingers warm with BCR  Flex/extension intact to wrist, thumb and fingers  Finger opposition intact  Finger adduction/abduction intact  Finger crossover intact  Subjectively states sensation intact to radial/medial/ulnar distribution  Incision well approximated with no redness, drainage or warmth, suture intact      BP (!) 145/77 (Site: Left Upper Arm, Position: Sitting)   Pulse 71   Temp 98 °F (36.7 °C) (Oral)   LMP 06/10/2013     XR:   Multiple views of R radius/ulna obtained today demonstrating     Assessment:  {No diagnosis found. (Refresh or delete this SmartLink)}    Plan:   Reviewed x-rays with patient today in office    Remove {PROC PERITONEAL LAVAGE SKIN CLOSURE:20188} from surgical incision line. Steri strips were placed. Patient tolerated procedure well with minimal pain. No Soaking or submerging incision in water until skin is fully healed. Instructed about scar massage and further scar management.  WB:  {CHP IP Weight Bearing Status:[id number]:o} - use assistive devices if needed   {BootBrace:29392}: On at all times, can remove for hygiene and therapy   Therapy: Attend therapy following the {PostOpTherapyProtocol:48189} protocol at the {#:90186} week nima     DVT: Continue with {PostOAC:19165} as ordered    Follow up in {NUMBER:39458} weeks with XR of the ***    Electronically signed by Alisa Forbes PA-C on 11/2/2020 at 11:48 AM  Note: This report was completed using Sphere 3d voiced recognition software. Every effort has been made to ensure accuracy; however, inadvertent computerized transcription errors may be present.

## 2020-11-17 ENCOUNTER — EVALUATION (OUTPATIENT)
Dept: OCCUPATIONAL THERAPY | Age: 50
End: 2020-11-17
Payer: COMMERCIAL

## 2020-11-17 PROCEDURE — 97140 MANUAL THERAPY 1/> REGIONS: CPT | Performed by: OCCUPATIONAL THERAPIST

## 2020-11-17 PROCEDURE — 97110 THERAPEUTIC EXERCISES: CPT | Performed by: OCCUPATIONAL THERAPIST

## 2020-11-17 PROCEDURE — 97165 OT EVAL LOW COMPLEX 30 MIN: CPT | Performed by: OCCUPATIONAL THERAPIST
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
## 2020-11-18 ENCOUNTER — TREATMENT (OUTPATIENT)
Dept: OCCUPATIONAL THERAPY | Age: 50
End: 2020-11-18
Payer: COMMERCIAL

## 2020-11-18 PROCEDURE — 97140 MANUAL THERAPY 1/> REGIONS: CPT | Performed by: OCCUPATIONAL THERAPIST

## 2020-11-18 PROCEDURE — 97110 THERAPEUTIC EXERCISES: CPT | Performed by: OCCUPATIONAL THERAPIST

## 2020-11-18 NOTE — PROGRESS NOTES
holds.   Scar Mass/Edema Control:      Soft Tissue/Retrograde x 10 min To loosen soft tissues and reduce edema in digits, hand, and wrist.   Scar Manipulation x 5 min To increase elasticity and prevent adhesions. Therapeutic Activity:                  Strengthening:                  Other:                    Assessment/Comments: Pt is making Good progress toward stated plan of care. Pt unable to make full fist at start of session, beginning at 4 cm away from [facility]. At end of session she was . 5-1 cm away from [facility]. Pt reporting instant relief in digits following soft tissue manipulation. Severe stiffness with supination, educated on self PROM exercise to complete for supination. All stretching was kept within pt's tolerance. Added new exercises to HEP with handouts this date with good understanding.      -Rehab Potential: Good  -Requires OT Follow Up: Yes              Time In: 3:00 pm           Time Out: 4:00 pm     Treatment Charges: Mins Units   Modalities     Ther Exercise 30 2   Manual Therapy 25 2   Thera Activities     ADL/Home Mgt      Neuro Re-education     Gait Training     Group Therapy     Non-Billable Service Time 5 0   Other     Total Time/Units 55 4     -Response to Treatment: Pt tolerated session well, ^'d pain with ext and supination stretch only. Goal Progress: Goals for pt can be see on initial eval occurring on 11/17/2020. Plan:   [x]  Continues Plan of care: Treatment covered based on POC and graduated to patient's progress. Pt education continues at each visit to obtain maximum benefits from skilled OT intervention.   []  Alter Plan of care:   []  Discharge:    Zoltan Alejandra Juan 89, OTR/L #[id number]

## 2020-11-25 ENCOUNTER — TREATMENT (OUTPATIENT)
Dept: OCCUPATIONAL THERAPY | Age: 50
End: 2020-11-25
Payer: COMMERCIAL

## 2020-11-25 PROCEDURE — 97110 THERAPEUTIC EXERCISES: CPT | Performed by: OCCUPATIONAL THERAPIST

## 2020-11-25 PROCEDURE — 97140 MANUAL THERAPY 1/> REGIONS: CPT | Performed by: OCCUPATIONAL THERAPIST

## 2020-11-25 NOTE — PROGRESS NOTES
OCCUPATIONAL THERAPY PROGRESS NOTE    Date:  2020  Initial Evaluation Date: 2020                          Evaluating Therapist: Wolf Beltran     Patient Name:  Brenda Borjas \"Yomi\"                  :  1970     Restrictions/Precautions:  Evaluate and treat 1-3 times per week for 6-8 weeks   NWB on RUE at this time   Gentle ROM and strengthening, digit motion, scar management, desensitization, edema control, HEP, modalities and manual therapy as needed , low fall risk  Diagnosis: S52. 91XD (ICD-10-CM) - Closed fracture of shaft of bone of right forearm with routine healing, subsequent encounter                                                    Insurance/Certification information: [facility]  Referring Practitioner: Dr. Taina Herrera  Date of Surgery/Injury: 10/17/2020  Plan of care signed (Y/N): Y  Visit# / total visits: 3 / 18    Pain Level: 0/10 pain at rest and with light exercises, mild pain if at full stretch, aching    Subjective:  Pt reports \"I was a little sore after our session and after a few things I did over the weekend, but it goes away shortly after. And look, I can touch all my finger tips. \"      Objective:  Updated POC to be completed by 10th session. INTERVENTION: COMPLETED: REPS/TIME: SPECIFICS/COMMENTS:   Modality:      Fluidotherapy       MHP x 5 min At start of session for soft tissue warm up. AROM/AAROM:      Wrist All Planes AROM x 10 reps ea Reviewed for HEP. Tendon Glides x 5 reps ea Reviewed for HEP. Thumb All Planes AROM x 20 reps ea Radial/[place] abduction, MP/IP flexion, opposition, IP/MP blocking. Added to HEP, provided with handout   Prayer Stretches x 10 reps W/ prolonged holds, added to HEP. Towel Exercises x 15 reps ea Fist scrunches, finger walks, and thumb scrunches.     Place and Holds x 10 reps ea Completed with full fist following PROM   Bean Grasp and Release x 20 reps Scoop beans into full flexion wrist flexion and release into full digit and wrist extension (pull finger tips to nose)   FM Tasks x 5 min Completed for AROM of digits and wrist with stretching incorporated:  -Turning over play cards   PROM/Stretching:      Wrist PROM All Planes x 15 reps ea W/ prolonged holds. Digit PROM x 15 rep ea Thumb and full fist with prolonged holds. Scar Mass/Edema Control:      Soft Tissue/Retrograde x 10 min To loosen soft tissues and reduce edema in digits, hand, and wrist.   Scar Manipulation x 5 min To increase elasticity and prevent adhesions. Therapeutic Activity:                  Strengthening:                  Other:                    Assessment/Comments: Pt is making Good progress toward stated plan of care. Pt able to make a full tight fist and complete full opposition at today's session. AROM of wrist continues to improve with supination being the most difficult. Scars are healing well with minimal scabbing remaining. Pt continues to introduce light functional activities to R hand within her tolerance. Pt reports 3/10 to no pain throughout session.     -Rehab Potential: Good  -Requires OT Follow Up: Yes              Time In: 2:00 pm           Time Out: 3:00 pm     Treatment Charges: Mins Units   Modalities     Ther Exercise 30 2   Manual Therapy 25 2   Thera Activities     ADL/Home Mgt      Neuro Re-education     Gait Training     Group Therapy     Non-Billable Service Time 5 0   Other     Total Time/Units 55 4     -Response to Treatment: Pt tolerated session well, decreased pain with stretching. Goal Progress: Goals for pt can be see on initial eval occurring on 11/17/2020. Plan:   [x]  Continues Plan of care: Treatment covered based on POC and graduated to patient's progress. Pt education continues at each visit to obtain maximum benefits from skilled OT intervention.   []  Alter Plan of care:   []  Discharge:    Ibeth Alejandra Juan 89, OTR/L #[id number]

## 2020-12-01 ENCOUNTER — OFFICE VISIT (OUTPATIENT)
Dept: ORTHOPEDIC SURGERY | Age: 50
End: 2020-12-01
Payer: COMMERCIAL

## 2020-12-01 ENCOUNTER — HOSPITAL ENCOUNTER (OUTPATIENT)
Dept: GENERAL RADIOLOGY | Age: 50
Discharge: HOME | End: 2020-12-03
Payer: COMMERCIAL

## 2020-12-01 VITALS — TEMPERATURE: 98.2 F | SYSTOLIC BLOOD PRESSURE: 114 MMHG | HEART RATE: 72 BPM | DIASTOLIC BLOOD PRESSURE: 75 MMHG

## 2020-12-01 PROCEDURE — 99212 OFFICE O/P EST SF 10 MIN: CPT | Performed by: PHYSICIAN ASSISTANT

## 2020-12-01 PROCEDURE — 99024 POSTOP FOLLOW-UP VISIT: CPT | Performed by: PHYSICIAN ASSISTANT

## 2020-12-01 PROCEDURE — 73090 X-RAY EXAM OF FOREARM: CPT

## 2020-12-01 NOTE — LETTER
[address]  [address] [facility] [address]  Phone: [phone]  Fax: [phone]    Bonny Ch PA-C        December 1, 2020     Patient: Cecily Robles   YOB: 1970   Date of Visit: 12/1/2020       To Whom It May Concern: It is my medical opinion that Cecily Robles is to remain off of work due to her right upper extremity surgery. Anticipate patient being off until 1/18/2021. If you have any questions or concerns, please don't hesitate to call.     Sincerely,        Bonny Ch PA-C

## 2020-12-01 NOTE — PROGRESS NOTES
OP: SURGEON: Dr. Tres Cormier MD  DATE OF PROCEDURE: 10/17/2020  PROCEDURE:Open reduction internal fixation right both bone forearm fracture    Subjective:  Alesha Ansari is approximately 6 week follow-up from the above surgery. Patient is partial weightbearing on that extremity. Pain to extremity is controlled, described more as aching and is taking ibuprofen PRN and icing. They complains of numbness to the hand intermittently throughout all finger tips but is improving. Denies Calf pain. Patient is participating in therapy and feels that she is improving. Patient is RHD. Inquiring how long she will be off work, works for [employer] in [place]. Review of Systems -    General ROS: negative for - chills, fatigue, fever or night sweats  Respiratory ROS: no cough, shortness of breath, or wheezing  Cardiovascular ROS: no chest pain or dyspnea on exertion  Gastrointestinal ROS: no abdominal pain, nausea, vomiting, diarrhea, constipation,or black or bloody stools  Genitourinary: no hematuria, dysuria, or incontinence   Musculoskeletal ROS: negative for -back or neck pain or stiffness, also see HPI  Neurological ROS: no TIA or stroke symptoms       Objective:    General: Alert and oriented X 3, normocephalic atraumatic, external ears and eye normal, sclera clear, no acute distress, respirations easy and unlabored with no audible wheezes, skin warm and dry, speech and dress appropriate for noted age, affect euthymic. Extremity:  Right Upper Extremity  · Incisions healing well without overlying erythema, warmth or drainage.  No hyper sensitivity to incisions  · +TTP to the Compartments soft and compressible  · +AIN/PIN/Ulnar nerve function intact grossly, continues weak with AIN  · Patient able to make a full concentric fist  · Continues to have limited wrist flexion and forearm supination but improving  · +Radial pulse, Brisk Cap refill, hand warm and perfused  · Sensation intact to touch in radial/ulnar/median nerve distributions to hand, endorses mildly diminished sensation at fingertips. · Has more discomfort to the distal biceps than at fracture sites. /75   Pulse 72   Temp 98.2 °F (36.8 °C) (Oral)   LMP 06/10/2013     XR:   AP/Lateral views of the of the right radius/ulna demonstrates ORIF of BB fractures, alignment well maintained. No evidence of hardware failure or loosening, interval healing appreciated    Assessment:   Diagnosis Orders   1. Closed fracture of shaft of bone of right forearm with routine healing, subsequent encounter         Plan:  Reviewed x-rays with patient today in office  Recommend minimizing if not completely abstaining from nicotine use  Continue limited weight bearing on the right upper extremity  Continue therapy  Continue ice/elevation    Start scar massage   OK to use Vaseline on incisions  Letter for work provided    Electronically signed by Meme Peralta PA-C on 12/1/2020 at 6:53 PM  Note: This report was completed using [employer] voiced recognition software. Every effort has been made to ensure accuracy; however, inadvertent computerized transcription errors may be present.

## 2020-12-01 NOTE — PATIENT INSTRUCTIONS
Continue limited weight bearing on the right upper extremity  Continue therapy  Continue ice/elevation    Start scar massage   OK to use Vaseline on incisions  Letter for work provided

## 2020-12-02 ENCOUNTER — TREATMENT (OUTPATIENT)
Dept: OCCUPATIONAL THERAPY | Age: 50
End: 2020-12-02
Payer: COMMERCIAL

## 2020-12-02 PROCEDURE — 97022 WHIRLPOOL THERAPY: CPT | Performed by: OCCUPATIONAL THERAPIST

## 2020-12-02 PROCEDURE — 97140 MANUAL THERAPY 1/> REGIONS: CPT | Performed by: OCCUPATIONAL THERAPIST

## 2020-12-02 PROCEDURE — 97110 THERAPEUTIC EXERCISES: CPT | Performed by: OCCUPATIONAL THERAPIST

## 2020-12-02 NOTE — PROGRESS NOTES
OCCUPATIONAL THERAPY PROGRESS NOTE    Date:  2020  Initial Evaluation Date: 2020                          Evaluating Therapist: Edu Taylor     Patient Name:  Jovi Dennis \"[place]\"                  :  1970     Restrictions/Precautions:  Evaluate and treat 1-3 times per week for 6-8 weeks   NWB on RUE at this time   Gentle ROM and strengthening, digit motion, scar management, desensitization, edema control, HEP, modalities and manual therapy as needed , low fall risk  Diagnosis: S52. 91XD (ICD-10-CM) - Closed fracture of shaft of bone of right forearm with routine healing, subsequent encounter                                                    Insurance/Certification information: Joy Russell  Referring Practitioner: Dr. Hilary Little  Date of Surgery/Injury: 10/17/2020  Plan of care signed (Y/N): Y  Visit# / total visits:     Pain Level: 0 /10 pain at rest and with light exercises, mild pain if at full stretch, aching    Subjective:  Pt reports when bending the RUE she notices pain and stiffness. Objective:  Updated POC to be completed by 10th session. INTERVENTION: COMPLETED: REPS/TIME: SPECIFICS/COMMENTS:   Modality:      Fluidotherapy  x 10 mins  to RUE with AROM encouraged. MHP  5 min At start of session for soft tissue warm up. AROM/AAROM:      Wrist All Planes AROM  10 reps ea Reviewed for HEP. Tendon Glides  5 reps ea Reviewed for HEP. Elbow  x x10 Completed with therapist assistance over elevated wedge and held extension range for 3-5 seconds. Thumb All Planes AROM  20 reps ea Radial/[place]wood abduction, MP/IP flexion, opposition, IP/MP blocking. Added to HEP, provided with handout   Prayer Stretches x 15 reps W/ prolonged holds, added to HEP. Wristisizer x 5 mins With RUE and elbow on table for wrist isolation   Towel Exercises x 15 reps ea Fist scrunches, finger walks, and thumb scrunches.     Supination/pronation x x15 RUE holding therabar with elbow bent Place and Holds  10 reps ea Completed with full fist following PROM   Bean Grasp and Release  20 reps Scoop beans into full flexion wrist flexion and release into full digit and wrist extension (pull finger tips to nose)   FM Tasks  5 min Completed for AROM of digits and wrist with stretching incorporated:  -Turning over play cards   PROM/Stretching:      Wrist PROM All Planes  15 reps ea W/ prolonged holds. Digit PROM  15 rep ea Thumb and full fist with prolonged holds. Scar Mass/Edema Control:      Soft Tissue/Retrograde  10 min To loosen soft tissues and reduce edema in digits, hand, and wrist.   Scar Manipulation  5 min To increase elasticity and prevent adhesions. Therapeutic Activity:                  Strengthening:                  Other:      Kinesio Tape application x  Completed 4 basket weave strips over scar sites to assist with edema management and scar management. Pt educated on wear time and how to remove tape if irritation should occur. Assessment/Comments: Pt is making Good progress toward stated plan of care. Pt tolerated session well and kinesio tape application was completed at end of the session to assist with scar management. Pt educated to work on pronation/supination with something in hand to make it easier to complete exercises. Pt continues to demonstrate improved motion in the wrist and did report slight stiffness in the elbow which stretching was completed with good tolerance. Added stretching of elbow to HEP to avoid increased stiffness and pain from occurring.       -Rehab Potential: Good  -Requires OT Follow Up:  Yes              Time In: 3:00 pm           Time Out: 4:00 pm     Treatment Charges: Mins Units   Modalities- Fluido 15 1   Ther Exercise 25 2   Manual Therapy 20 1   Thera Activities     ADL/Home Mgt      Neuro Re-education     Gait Training     Group Therapy     Non-Billable Service Time     Other     Total Time/Units 60 4     -Response to Treatment: Pt tolerated session well, decreased pain with stretching. Goal Progress: Goals for pt can be see on initial eval occurring on 11/17/2020. Plan:   [x]  Continues Plan of care: Treatment covered based on POC and graduated to patient's progress. Pt education continues at each visit to obtain maximum benefits from skilled OT intervention.   []  Alter Plan of care:   []  Discharge:    Ronaldo Alejandra Juan 89, OTR/L #[id number]

## 2020-12-07 ENCOUNTER — TREATMENT (OUTPATIENT)
Dept: OCCUPATIONAL THERAPY | Age: 50
End: 2020-12-07
Payer: COMMERCIAL

## 2020-12-07 PROCEDURE — 97022 WHIRLPOOL THERAPY: CPT | Performed by: OCCUPATIONAL THERAPIST

## 2020-12-07 PROCEDURE — 97110 THERAPEUTIC EXERCISES: CPT | Performed by: OCCUPATIONAL THERAPIST

## 2020-12-07 PROCEDURE — 97140 MANUAL THERAPY 1/> REGIONS: CPT | Performed by: OCCUPATIONAL THERAPIST

## 2020-12-07 NOTE — PROGRESS NOTES
abduc/adduction x 10 rep's ea With rubber band around all fingers during abduction and hold away from [facility] fro adduction resistance. Added to HEP    Thumb All Planes AROM x 20 reps ea Radial/[place]wood abduction, MP/IP flexion, opposition, IP/MP blocking. Added to HEP, provided with handout   FDS glide for IF x 10 rep's  Hold other fingers down to isolate IF, after ending give resistance. Added to HEP    Prayer Stretches  15 reps W/ prolonged holds, added to HEP. Wristisizer x 5 mins With RUE and elbow on table for wrist isolation. Sitting away from table arm by side using wrist and forearm only. Towel Exercises  15 reps ea Fist scrunches, finger walks, and thumb scrunches. Supination/pronation x x15 RUE holding therabar with elbow bent   Thumb IP Place and Holds x 10 reps ea After thumb ext stretch and  following PROM added to HEP. Thumb in  Neutral.    Adhikari Grasp and Release  20 reps Scoop beans into full flexion wrist flexion and release into full digit and wrist extension (pull finger tips to nose)   FM Tasks  5 min Completed for AROM of digits and wrist with stretching incorporated:  -Turning over play cards   PROM/Stretching:      Wristforearm PROM All Planes x  W/ prolonged holds. Digit PROM x 15 rep ea Thumb and full fist with prolonged holds. Thumb abduction stretch with wrist ext to stretch FPL. Scar Mass/Edema Control:      Soft Tissue/Retrograde x 10 min To loosen soft tissues and reduce edema in digits, hand, and wrist.   compression sleeve x  E on forearm and D wrist and proc forearm. Scar Manipulation x 5 min To increase elasticity and prevent adhesions and interossious membrane rotation. Therapeutic Activity:                  Strengthening:                  Other:      Kinesio Tape application   Completed 4 basket weave strips over scar sites to assist with edema management and scar management. Pt educated on wear time and how to remove tape if irritation should occur.     HEP  x Continue with and added thumb flexion IP place and hold and thumb stretch, FDS glide IF, SF adduc and abduction with #18 rubber band. Assessment/Comments: Pt is making Good progress toward stated plan of care. Pt tolerated session well with no ^ in pain. Pt showing improved motion throughout her R arm. Therapist noted a possible adhesion in her forearm in her FPL. Added to her HEP - thumb stretch (including the thumb IP) while stretching her wrist to ^  and thumb IP place and hold. Also added SF finger adduc and abduction 2* to seeing weakness in that muscle uisng #18 rubber band. She appeared to understand all new instructions. .     -Rehab Potential: Good  -Requires OT Follow Up: Yes              Time In: 3:00 pm           Time Out: 4:00 pm     Treatment Charges: Mins Units   Modalities- Fluido 10 1   Ther Exercise 30 2   Manual Therapy 20 1   Thera Activities     ADL/Home Mgt      Neuro Re-education     Gait Training     Group Therapy     Non-Billable Service Time     Other     Total Time/Units 60 4     -Response to Treatment: Pt tolerated session well, decreased pain with stretching and increased mobility. Goal Progress: Goals for pt can be see on initial eval occurring on 11/17/2020. Plan:   [x]  Continues Plan of care: Treatment covered based on POC and graduated to patient's progress. Pt education continues at each visit to obtain maximum benefits from skilled OT intervention.   []  Alter Plan of care:   []  Discharge:    Matt Hawthorne OT/NIRAV, T

## 2020-12-09 ENCOUNTER — TREATMENT (OUTPATIENT)
Dept: OCCUPATIONAL THERAPY | Age: 50
End: 2020-12-09
Payer: COMMERCIAL

## 2020-12-09 PROCEDURE — 97022 WHIRLPOOL THERAPY: CPT | Performed by: OCCUPATIONAL THERAPIST

## 2020-12-09 PROCEDURE — 97140 MANUAL THERAPY 1/> REGIONS: CPT | Performed by: OCCUPATIONAL THERAPIST

## 2020-12-09 PROCEDURE — 97110 THERAPEUTIC EXERCISES: CPT | Performed by: OCCUPATIONAL THERAPIST

## 2020-12-09 NOTE — PROGRESS NOTES
OCCUPATIONAL THERAPY PROGRESS NOTE    Date:  2020  Initial Evaluation Date: 2020                          Evaluating Therapist: Gaurang Maldonado     Patient Name:  Evita Loges \"Yomi\"                  :  1970     Restrictions/Precautions:  Evaluate and treat 1-3 times per week for 6-8 weeks   NWB on RUE at this time   Gentle ROM and strengthening, digit motion, scar management, desensitization, edema control, HEP, modalities and manual therapy as needed , low fall risk  Diagnosis: S52. 91XD (ICD-10-CM) - Closed fracture of shaft of bone of right forearm with routine healing, subsequent encounter                                                    Insurance/Certification information: Maxwell Oneill  Referring Practitioner: Dr. Suzie Godinez  Date of Surgery/Injury: 10/17/2020  Plan of care signed (Y/N): Y  Visit# / total visits:  ) [phone] Screening completed upon entering facility and patient cleared for treatment today. Pt followed all protocols set forth by [facility] for Outpatient services throughout therapy session. Patient has been made aware of all new hygiene protocols due to COVID-19 set forth by [facility] Outpatient services and agrees to abide by all protocols. Pain Level: 0 /10 pain at rest and with light exercises, mild pain if at full stretch, aching in the fingers    Subjective:  Pt states I am feeling better by my fingers are just driving me nuts. Objective:  Updated POC to be completed by 10th session. INTERVENTION: COMPLETED: REPS/TIME: SPECIFICS/COMMENTS:   Modality:      Fluidotherapy  x 15 mins  to RUE with AROM encouraged. MHP  5 min At start of session for soft tissue warm up. AROM/AAROM:      Wrist All Planes AROM  10 reps ea Reviewed for HEP. Tendon Glides  5 reps ea Reviewed for HEP. Elbow  x x10 Completed with therapist assistance over elevated wedge and held extension range for 3-5 seconds.     SF abduc/adduction  10 flexion IP place and hold and thumb stretch, FDS glide IF, SF adduc and abduction with #18 rubber band. Assessment/Comments: Pt is making Good progress toward stated plan of care. Pt demonstrates less stiffness in her digits and improvement with decreased scar tissue at the end of the session. Pt continues to report tingling in the digits and difficulty with completing movement of the thumb.      -Rehab Potential: Good  -Requires OT Follow Up: Yes              Time In: 3:00 pm           Time Out: 4:00 pm     Treatment Charges: Mins Units   Modalities- Fluido 10 1   Ther Exercise 30 2   Manual Therapy 20 1   Thera Activities     ADL/Home Mgt      Neuro Re-education     Gait Training     Group Therapy     Non-Billable Service Time     Other     Total Time/Units 60 4     -Response to Treatment: Pt tolerated session well, decreased pain with stretching and increased mobility. Goal Progress: Goals for pt can be see on initial eval occurring on 11/17/2020. Plan:   [x]  Continues Plan of care: Treatment covered based on POC and graduated to patient's progress. Pt education continues at each visit to obtain maximum benefits from skilled OT intervention.   []  Alter Plan of care:   []  Discharge:    Ann-Marie Alejandra Juan 89, OTR/L #[id number]

## 2020-12-14 ENCOUNTER — TREATMENT (OUTPATIENT)
Dept: OCCUPATIONAL THERAPY | Age: 50
End: 2020-12-14
Payer: COMMERCIAL

## 2020-12-14 PROCEDURE — 97140 MANUAL THERAPY 1/> REGIONS: CPT | Performed by: OCCUPATIONAL THERAPIST

## 2020-12-14 PROCEDURE — 97110 THERAPEUTIC EXERCISES: CPT | Performed by: OCCUPATIONAL THERAPIST

## 2020-12-14 PROCEDURE — 97022 WHIRLPOOL THERAPY: CPT | Performed by: OCCUPATIONAL THERAPIST

## 2020-12-14 NOTE — PROGRESS NOTES
OCCUPATIONAL THERAPY PROGRESS NOTE    Date:  2020  Initial Evaluation Date: 2020                          Evaluating Therapist: Gilbert Byrd     Patient Name:  Kody Peace \"Yomi\"                  :  1970     Restrictions/Precautions:  Evaluate and treat 1-3 times per week for 6-8 weeks   NWB on RUE at this time   Gentle ROM and strengthening, digit motion, scar management, desensitization, edema control, HEP, modalities and manual therapy as needed , low fall risk  Diagnosis: S52. 91XD (ICD-10-CM) - Closed fracture of shaft of bone of right forearm with routine healing, subsequent encounter                                                    Insurance/Certification information: Salbador Hong  Referring Practitioner: Dr. Alvin Steve  Date of Surgery/Injury: 10/17/2020  Plan of care signed (Y/N): Y  Visit# / total visits:  [phone] Screening completed upon entering facility and patient cleared for treatment today. Pt followed all protocols set forth by [facility] for Outpatient services throughout therapy session. Patient has been made aware of all new hygiene protocols due to COVID-19 set forth by [facility] Outpatient services and agrees to abide by all protocols. Pain Level: 0 /10 pain at rest and with light exercises, mild pain if at full stretch, aching in the fingers    Subjective:  Pt states she feel at a store yesterday and she put her arm out to stop herself. ( Reports soreness from the fall)     Objective:  Updated POC to be completed by 10th session. INTERVENTION: COMPLETED: REPS/TIME: SPECIFICS/COMMENTS:   Modality:      Fluidotherapy  x 15 mins  to RUE with AROM encouraged. MHP  5 min At start of session for soft tissue warm up. AROM/AAROM:      Wrist All Planes AROM  10 reps ea Reviewed for HEP. Tendon Glides  5 reps ea Reviewed for HEP.    Elbow  x x10 Completed with therapist assistance over elevated wedge and held extension range for 3-5 seconds. SF abduc/adduction  10 rep's ea With rubber band around all fingers during abduction and hold away from [facility] fro adduction resistance. Added to HEP    Thumb All Planes AROM  20 reps ea Radial/[place] abduction, MP/IP flexion, opposition, IP/MP blocking. Added to HEP, provided with handout   FDS glide for IF  10 rep's  Hold other fingers down to isolate IF, after ending give resistance. Added to HEP    Prayer Stretches  15 reps W/ prolonged holds, added to HEP. Wristisizer x 5 mins With RUE and elbow on table for wrist isolation. Sitting away from table arm by side using wrist and forearm only. Towel Exercises x 15 reps ea Fist scrunches, finger walks, and thumb scrunches. Supination/pronation  x15 RUE holding therabar with elbow bent   Thumb IP Place and Holds x 10 reps ea After thumb ext stretch and  following PROM added to HEP. Thumb in  Neutral.    Adhikari Grasp and Release  20 reps Scoop beans into full flexion wrist flexion and release into full digit and wrist extension (pull finger tips to nose)   FM Tasks  5 min Completed for AROM of digits and wrist with stretching incorporated:  -Turning over play cards   PROM/Stretching:      Wristforearm PROM All Planes x  W/ prolonged holds. Digit PROM x 15 rep ea Thumb and full fist with prolonged holds. Thumb abduction stretch with wrist ext to stretch FPL. Scar Mass/Edema Control:      Soft Tissue/Retrograde x  To loosen soft tissues and reduce edema in digits, hand, and wrist.   compression sleeve x  E on forearm and D wrist and proc forearm. Scar Manipulation x 20 mins To increase elasticity and prevent adhesions and interossious membrane rotation. Therapeutic Activity:                  Strengthening:                  Other:      Ruiz strap x  Ruiz trap issued to patient for prolonged stretching of thumb IP to assist with scar tissue management.  Pt educated on how to properly don and to wear 5x/day for starting at 3 mins at a time for prolonged stretching with place and holds after completing. Kinesio Tape application   Completed 4 basket weave strips over scar sites to assist with edema management and scar management. Pt educated on wear time and how to remove tape if irritation should occur. HEP  x  Continue with and added thumb flexion IP place and hold and thumb stretch, FDS glide IF, SF adduc and abduction with #18 rubber band. Assessment/Comments: Pt is making Good progress toward stated plan of care. Pt demonstrates less stiffness in her digits and improvement with decreased scar tissue at the end of the session. Pt continues to report tingling in the digits and difficulty with completing movement of the thumb. Ruiz strap issued to assist with stretching in conjunction with place and holds.     -Rehab Potential: Good  -Requires OT Follow Up: Yes              Time In: 3:00 pm           Time Out: 4:00 pm     Treatment Charges: Mins Units   Modalities- Fluido 15 1   Ther Exercise 25 2   Manual Therapy 20 1   Thera Activities     ADL/Home Mgt      Neuro Re-education     Gait Training     Group Therapy     Non-Billable Service Time     Other     Total Time/Units 60 4     -Response to Treatment: Pt tolerated session well, decreased pain with stretching and increased mobility. Goal Progress: Goals for pt can be see on initial eval occurring on 11/17/2020. Plan:   [x]  Continues Plan of care: Treatment covered based on POC and graduated to patient's progress. Pt education continues at each visit to obtain maximum benefits from skilled OT intervention.   []  Alter Plan of care:   []  Discharge:    Javed Alejandra Juan 89, OTR/L #[id number]

## 2020-12-15 ENCOUNTER — TELEPHONE (OUTPATIENT)
Dept: ORTHOPEDIC SURGERY | Age: 50
End: 2020-12-15

## 2020-12-15 NOTE — TELEPHONE ENCOUNTER
Patient called office requesting status of [employer] disability paperwork. Upon looking through office, no paperwork was found. Call placed to [address]. Spoke to [employer]. Provided fax number to send paperwork to office with ATTN: Sabra Johnson. Advised patient of situation. Patient verbalized understanding.

## 2021-01-18 ENCOUNTER — TREATMENT (OUTPATIENT)
Dept: OCCUPATIONAL THERAPY | Age: 51
End: 2021-01-18
Payer: COMMERCIAL

## 2021-01-18 DIAGNOSIS — S52.91XD CLOSED FRACTURE OF SHAFT OF BONE OF RIGHT FOREARM WITH ROUTINE HEALING, SUBSEQUENT ENCOUNTER: Primary | ICD-10-CM

## 2021-01-18 DIAGNOSIS — S52.91XD: ICD-10-CM

## 2021-01-18 PROCEDURE — 97110 THERAPEUTIC EXERCISES: CPT | Performed by: OCCUPATIONAL THERAPIST

## 2021-01-18 PROCEDURE — 97140 MANUAL THERAPY 1/> REGIONS: CPT | Performed by: OCCUPATIONAL THERAPIST

## 2021-01-18 PROCEDURE — 97530 THERAPEUTIC ACTIVITIES: CPT | Performed by: OCCUPATIONAL THERAPIST

## 2021-01-18 PROCEDURE — 97022 WHIRLPOOL THERAPY: CPT | Performed by: OCCUPATIONAL THERAPIST

## 2021-01-18 NOTE — PROGRESS NOTES
OCCUPATIONAL THERAPY PROGRESS NOTE  RE-ASSESSMENT    Date:  2021  Initial Evaluation Date: 2020                          Evaluating Therapist: Alexandru Valente     Patient Name:  Joeann Phoenix \"[place]\"                  :  1970     Restrictions/Precautions:  Evaluate and treat 1-3 times per week for 6-8 weeks   NWB on RUE at this time   Gentle ROM and strengthening, digit motion, scar management, desensitization, edema control, HEP, modalities and manual therapy as needed , low fall risk  Diagnosis: S52. 91XD (ICD-10-CM) - Closed fracture of shaft of bone of right forearm with routine healing, subsequent encounter                                                    Insurance/Certification information: [facility]  Referring Practitioner: Dr. Nico Gordon  Date of Surgery/Injury: 10/17/2020  Plan of care signed (Y/N): Y  Visit# / total visits:  [phone] Screening completed upon entering facility and patient cleared for treatment today. Pt followed all protocols set forth by [address] [place] [address] for Outpatient services throughout therapy session. Patient has been made aware of all new hygiene protocols due to COVID-19 set forth by [address] [place] [address] Outpatient services and agrees to abide by all protocols. Pain Level: 0-2 /10 pain at rest and with light exercises, mild pain if at full stretch, aching in the fingers    Subjective:  Pt states \"I have a hard time picking up things like a gallon of milk, and I still have a hard time using my fingers. I get some discomfort at times, but not really pain. I hope I didn't set myself behind not being here. \"     Objective:  Updated POC to be completed by 10th session. INTERVENTION: COMPLETED: REPS/TIME: SPECIFICS/COMMENTS:   Modality:      Fluidotherapy  x 15 mins  to RUE with AROM encouraged. MHP  5 min At start of session for soft tissue warm up. AROM/AAROM:      Wrist All Planes AROM x 10 reps ea Reviewed for HEP.    Tendon Glides 5 reps ea Reviewed for HEP. Elbow   x10 Completed with therapist assistance over elevated wedge and held extension range for 3-5 seconds. SF abduc/adduction  10 rep's ea With rubber band around all fingers during abduction and hold away from [facility] fro adduction resistance. Added to HEP    Thumb All Planes AROM  20 reps ea Radial/Atwood abduction, MP/IP flexion, opposition, IP/MP blocking. Added to HEP, provided with handout   FDS glide for IF  10 rep's  Hold other fingers down to isolate IF, after ending give resistance. Added to HEP    Prayer Stretches  15 reps W/ prolonged holds, added to HEP. Wristisizer x 5 mins With RUE and elbow on table for wrist isolation. Sitting away from table arm by side using wrist and forearm only. Towel Exercises  15 reps ea Fist scrunches, finger walks, and thumb scrunches. Supination/pronation  x15 RUE holding therabar with elbow bent   Thumb IP Place and Holds  10 reps ea After thumb ext stretch and  following PROM added to HEP. Thumb in  Neutral.    Adhikari Grasp and Release  20 reps Scoop beans into full flexion wrist flexion and release into full digit and wrist extension (pull finger tips to nose)   FM Tasks  5 min Completed for AROM of digits and wrist with stretching incorporated:  -Turning over play cards   PROM/Stretching:      Wristforearm PROM All Planes x  W/ prolonged holds. Digit PROM x 15 rep ea Thumb and full fist with prolonged holds. Thumb abduction stretch with wrist ext to stretch FPL. Scar Mass/Edema Control:      Soft Tissue/Retrograde x  To loosen soft tissues and reduce edema in digits, hand, and wrist.   compression sleeve x  E on forearm and D wrist and proc forearm. Scar Manipulation x 20 mins To increase elasticity and prevent adhesions and interossious membrane rotation. Therapeutic Activity:                  Strengthening:                  Other:      Re-Assessment x 20 min In-depth review of progress and HEP.    Scar Pad x  Issued gel PROM[]? IE  1/1/2021          ELBOW Flexion 0-150* 118* 140*          Extension 0* 26* 0*           FOREARM Pronation 80-90* 87* WFLs         Supination 80-90* 60* 70*           WRIST Flexion 0-70* 60*  64*         Extension 0-80* 30*  60*         Radial Deviation 0-20* 10*  25*          Ulnar Deviation 0-30* 30*  35*          Sensation: Pt demonstrates median nerve deficits. Able To Sense (Y) / Unable to Sense (N)  SEMMES-THERON Thumb 2nd Digit 3rd Digit  4th Digit  5th Digit    Normal Touch  Size: 2.83             Diminished Light Touch   Size: 3.61  x  x - 75% of time x  x x   Diminished Protective Sense  Size: 4.31 x x x       Loss of Protective Sense   Size: 4.56             Loss of Sensation  Size: 6.65                Edema Description/Circumferential Measurements:              R elbow: 25 cm to 23.5 cm                                                                             L elbow: 23.5 cm              R forearm in Center:   21.7 cm  to 20.9 cm                                                    L forearm in the center: 20.5 cm              R wrist: 17 cm  to 15.3 cm                                                                              L wrist: 15.1 cm              R MCP's starting at base of 3rd digit:   18.5 cm                                              L MCP's: 18.5 cm   Dynamometer (setting 2):                              Left: TBA  to  79#                                           Right: TBA  to  55#  (some discomfort in thenar eminence)               Pinch Meter:              Lateral: Left= TBA to 15#, Right= TBA to 4#              Palmar 3 point: Left= TBA to 12.5#, Right= TBA to 6#  9 Hole Peg Test:              Left: TBA to 21 sec              Right: TBA to 23 sec (discomfort in FCR)     QuickDASH Score: 81.8% to 59.1% disability    -Rehab Potential: Good  -Requires OT Follow Up:  Yes              Time In: 11:05 am           Time Out: 12:00 pm     Treatment Charges: Mins Units Modalities- Fluido 15 1   Ther Exercise 15 1   Manual Therapy 15 1   Thera Activities 20 1   ADL/Home Mgt      Neuro Re-education     Gait Training     Group Therapy     Non-Billable Service Time     Other     Total Time/Units 55 4     -Response to Treatment: Pt tolerated session well. Goal Progress: Goals for pt can be see on initial eval occurring on 11/17/2020. GOALS (Long term same as Short term):  1) Patient will demonstrate good understanding of home program (exercises/activities/diagnosis/prognosis/goals) with good accuracy. Progressing well, in-depth review of HEP with good understanding. 2) Patient will demonstrate increased active/passive range of motion of their RUE to [facility] for ADL/IADL completion. Progressing well, refer to measurements 1/18. 3) Patient will demonstrate increased /pinch strength of at least 10 / 5 pinch pounds of their R hand. Assessed today. 4) Patient to report decreased pain in their affected R upper extremity from 5/10 to 2/10 or less with resistive functional use. Progressing well, minimal to no pain reported on a daily basis. 5) Increase in fine motor function as evidenced by decreased time to complete 9-hole peg test and/or MRMT test by at least 5-10 seconds. Assessed today, time is functional.  6) Patient to demonstrate decreased guarding of their affected extremity from 100% to 20% or less. Progressing well, <50%. 7) Patient will be knowledgeable of edema control techniques as evident with decreases from moderate to mild/none. Goal met, refer to measurements. 8) Patient will demonstrate a non-tender/non-adherent scar. Progressing well, mild keloiding and dense scar tissue palpated over radial scar proximally. Scar pad issued 1/18. 9) Patient will report ADL functions as Mod I/I using RUE. Progressing well, mild difficulty with fine motor tasks - requires extended time. Mild difficulty with fastening, zipping, and buttoning.    10) Patient will demonstrate improved functional activity tolerance from poor to good for ADL/IADL completion. Progressing well, difficulty with picking up objects greater then 5 lbs such as a gallon of milk or opening a jar that requires ^'d  force of the digits. 11) Patient will decrease QuickDASH score to 30% or less for increased participation in daily functional activities. Progressing well, 81.8% to 59.1% disability. Plan:   [x]  Continues Plan of care: Continue 2x/week for 4-6 additional weeks to continue progress toward ROM, strengthening, fine motor, and overall improving functional use of her R UE. Treatment covered based on POC and graduated to patient's progress. Pt education continues at each visit to obtain maximum benefits from skilled OT intervention.   []  Alter Plan of care:   []  Discharge:    Maria Luisa Alejandra Juan 89, OTR/L #[id number]

## 2021-01-20 ENCOUNTER — HOSPITAL ENCOUNTER (OUTPATIENT)
Dept: GENERAL RADIOLOGY | Age: 51
Discharge: HOME | End: 2021-01-22
Payer: COMMERCIAL

## 2021-01-20 ENCOUNTER — OFFICE VISIT (OUTPATIENT)
Dept: ORTHOPEDIC SURGERY | Age: 51
End: 2021-01-20
Payer: COMMERCIAL

## 2021-01-20 VITALS — TEMPERATURE: 98.3 F

## 2021-01-20 DIAGNOSIS — G56.11 MEDIAN NEUROPATHY, RIGHT: ICD-10-CM

## 2021-01-20 DIAGNOSIS — S52.91XD CLOSED FRACTURE OF SHAFT OF BONE OF RIGHT FOREARM WITH ROUTINE HEALING, SUBSEQUENT ENCOUNTER: ICD-10-CM

## 2021-01-20 DIAGNOSIS — S52.91XD CLOSED FRACTURE OF SHAFT OF BONE OF RIGHT FOREARM WITH ROUTINE HEALING, SUBSEQUENT ENCOUNTER: Primary | ICD-10-CM

## 2021-01-20 PROCEDURE — L3809 WHFO W/O JOINTS PRE OTS: HCPCS | Performed by: PHYSICIAN ASSISTANT

## 2021-01-20 PROCEDURE — 73090 X-RAY EXAM OF FOREARM: CPT

## 2021-01-20 PROCEDURE — 99212 OFFICE O/P EST SF 10 MIN: CPT | Performed by: ORTHOPAEDIC SURGERY

## 2021-01-20 PROCEDURE — 99024 POSTOP FOLLOW-UP VISIT: CPT | Performed by: PHYSICIAN ASSISTANT

## 2021-01-20 RX ORDER — ACETAMINOPHEN 500 MG
500 TABLET ORAL EVERY 6 HOURS PRN
COMMUNITY

## 2021-01-20 RX ORDER — BUPRENORPHINE AND NALOXONE 8; 2 MG/1; MG/1
FILM, SOLUBLE BUCCAL; SUBLINGUAL 2 TIMES DAILY
COMMUNITY
End: 2021-04-13

## 2021-01-20 NOTE — PATIENT INSTRUCTIONS
Progress to Weightbearing as tolerated on right upper extremity  Restarting therapy, will recommend with OT specifics for median nerve.   Resting night splint try to wear all night to prevent compression of your carpal tunnel  If this is showing no improvement over the next 3-4 weeks let me know and I'll order EMG/NCT to further evaluate    Also keep an eye on right hand little finger when bringing all fingers back together    Letter provided for additional time off of work due to hiatus from therapy due to covid exposure

## 2021-01-20 NOTE — PROGRESS NOTES
Chief Complaint   Patient presents with    Arm Injury     orif both bone forearm fx, dos: 10/17/20; patient states that her thumb and index finger are always numb and her middle finger is numb about 50% of the time; patient states she took about four weeks off of PT due to a possible exposure of covid in december; just returned to PT this week 1/20/21; patient states she has trouble zipping her coat and doing buttons; patient is able to write but not placing the pen in the normal fingers; 0/10 pain        OP:SURGEON: Dr. Alexandr Falcon MD  DATE OF PROCEDURE: 10/17/2020  PROCEDURE:ORIF of right BB forearm fracture    POD: 3 months    Subjective:  Camille Carrizales is following up from the above surgery. She is WBAT on right upper extremity patient has been increasing with therapy. She no longer is using any bracing. Pain to extremity is moderate and is not taking prescribed pain medication from ortho, chronically on suboxone, patient is having new symptoms of significant tingling and paraesthesias through the right hand, worst first thing in the morning. . They complains of numbness, tingling to the right upper extremity. Denies calf pain, chest pain, or shortness of breath. Patient is  participating in therapy, outpatient therapy. Patient did have a haitus from OT due to covid 19 exposure. Patient states that symptoms to the right hand have significantly intensified since last office visit. Patient states that she feels increasingly clumsy with the R hand, states numbness is worst first thing in the morning. Patient has not yet tried any bracing.     Review of Systems -    General ROS: negative for - chills, fatigue, fever or night sweats  Respiratory ROS: no cough, shortness of breath, or wheezing  Cardiovascular ROS: no chest pain or dyspnea on exertion  Gastrointestinal ROS: no abdominal pain, nausea, vomiting, diarrhea, constipation,or black or bloody stools  Genitourinary: no hematuria, dysuria, or incontinence   Musculoskeletal ROS: negative for -back or neck pain or stiffness, also see HPI  Neurological ROS: no TIA or stroke symptoms       Objective:    General: Alert and oriented X 3, normocephalic atraumatic, external ears and eye normal, sclera clear, no acute distress, respirations easy and unlabored with no audible wheezes, skin warm and dry, speech and dress appropriate for noted age, affect euthymic. Extremity:  Right Upper Extremity  Skin is clean dry and intact no new rashes, lesions or evidence of skin breakdown  no edema noted  2+ Radial pulse, fingers warm with BCR  Flex/extension intact to wrist, thumb and fingers   Finger opposition intact  Finger adduction/abduction intact  Patient does have some weakness with little digit adduction, patient is able to complete but increased effort to complete  Finger crossover intact  able to make concentric fist  Patient able to perform full supination and pronation  Subjectively states sensation intact to Ulnar Nerve and Radial Nerve distribution. Significant paraesthesias to the median nerve distribution. No thenar atrophy noted when compared to contralateral side   +Tinels   + Durkins  Incision(s) well healed, no signs of infection, hypertrophic or keloid changes. She is using silicon scar strips to the forearm incisions  Patient has no TTP to the forearm    Temp 98.3 °F (36.8 °C)   LMP 06/10/2013     XR:   AP/Lateral views of the radius/ulna (Right) demonstrate intact hardware without signs of loosening or failure. Fracture lines remain visible but less so on todays xr. No other acute osseous abnormality noted. Assessment:   Diagnosis Orders   1. Closed fracture of shaft of bone of right forearm with routine healing, subsequent encounter     2. Median neuropathy, right         Plan:  Progress to Weightbearing as tolerated on right upper extremity  Restarting therapy, will recommend with OT specifics for median nerve.   Resting night splint try to wear all night to prevent compression of your carpal tunnel  If this is showing no improvement over the next 3-4 weeks let me know and I'll order EMG/NCT to further evaluate    Also keep an eye on right hand little finger when bringing all fingers back together    Letter provided for additional time off of work due to hiatus from therapy due to covid exposure    Electronically signed by Narciso Soria PA-C on 1/21/2021 at 9:03 AM  Note: This report was completed using Proficiency voiced recognition software. Every effort has been made to ensure accuracy; however, inadvertent computerized transcription errors may be present.

## 2021-01-20 NOTE — LETTER
[address]  [address] [facility] [address]  Phone: [phone]  Fax: [phone]    Yvonne Ayala PA-C      January 20, 2021     Patient: Vikki Bergman   YOB: 1970   Date of Visit: 1/20/2021       To Whom It May Concern: It is my medical opinion that Vikki Bergman will remain off of work until released by orthopedics due to needing additional therapy, had hiatus due to covid-19 exposure. Anticipate additional 6 weeks off from today. .    If you have any questions or concerns, please don't hesitate to call.     Sincerely,       Christina Guerrero MD

## 2021-01-21 ENCOUNTER — TREATMENT (OUTPATIENT)
Dept: OCCUPATIONAL THERAPY | Age: 51
End: 2021-01-21
Payer: COMMERCIAL

## 2021-01-21 DIAGNOSIS — S52.91XD: ICD-10-CM

## 2021-01-21 DIAGNOSIS — S52.91XD CLOSED FRACTURE OF SHAFT OF BONE OF RIGHT FOREARM WITH ROUTINE HEALING, SUBSEQUENT ENCOUNTER: Primary | ICD-10-CM

## 2021-01-21 PROCEDURE — 97110 THERAPEUTIC EXERCISES: CPT | Performed by: OCCUPATIONAL THERAPIST

## 2021-01-21 PROCEDURE — 97140 MANUAL THERAPY 1/> REGIONS: CPT | Performed by: OCCUPATIONAL THERAPIST

## 2021-01-21 PROCEDURE — 97530 THERAPEUTIC ACTIVITIES: CPT | Performed by: OCCUPATIONAL THERAPIST

## 2021-01-21 NOTE — PROGRESS NOTES
OCCUPATIONAL THERAPY PROGRESS NOTE      Date:  2021  Initial Evaluation Date: 2020                          Evaluating Therapist: Mirtha Rangel     Patient Name:  Jesenia Weathers \"[place]\"                  :  1970     Restrictions/Precautions:  Evaluate and treat 1-3 times per week for 6-8 weeks   NWB on RUE at this time ( since 2021 WBAT)  Gentle ROM and strengthening, digit motion, scar management, desensitization, edema control, HEP, modalities and manual therapy as needed , low fall risk  Diagnosis: S52. 91XD (ICD-10-CM) - Closed fracture of shaft of bone of right forearm with routine healing, subsequent encounter                                                    Insurance/Certification information: [facility]  Referring Practitioner: Dr. Maegan Webb  Date of Surgery/Injury: 10/17/2020   ( 13 weeks post op )  Plan of care signed (Y/N): Y  Visit# / total visits:  [phone] Screening completed upon entering facility and patient cleared for treatment today. [facility] therapy session. Patient has been made aware of all new hygiene protocols due to COVID-19 [facility] and agrees to abide by all protocols. Pain Level: 0-2 /10 pain at rest and with light exercises, mild pain if at full stretch, aching in the fingers    Subjective:  Pt states \"I saw the Dr yesterday and she gave me a wrist brace to wear at night to see is that helps the finger numbness. She 's going to touch base with me in 3-4 weeks. \"     Objective:  Updated POC to be completed by last session. INTERVENTION: COMPLETED: REPS/TIME: SPECIFICS/COMMENTS:   Modality:      Fluidotherapy   15 mins  to RUE with AROM encouraged. MHP  5 min At start of session for soft tissue warm up. AROM/AAROM:      Wrist All Planes AROM x 10 reps ea Reviewed for HEP.    Tendon Glides x 5 reps ea Reviewed for HEP. No full fisting   Elbow   x10 Completed with therapist assistance over elevated wedge and held extension range for 3-5 seconds. SF abduc/adduction x 10 rep's ea With rubber band around all fingers during abduction and hold away from [facility] fro adduction resistance. Added to HEP with size 14 rubber band to add resistance    Thumb All Planes AROM x 20 reps ea Radial/[place] abduction, MP/IP flexion, opposition, IP/MP blocking. Added to HEP, provided with handout   FDS glide for IF  10 rep's  Hold other fingers down to isolate IF, after ending give resistance. Added to HEP    Prayer Stretches  15 reps W/ prolonged holds, added to HEP. Wristisizer x 5 mins With RUE and elbow on table for wrist isolation. 1/2 with elbow on table and 1/2 Sitting away from table arm by side using wrist and forearm only. Towel Exercises  15 reps ea Fist scrunches, finger walks, and thumb scrunches. Supination/pronation  x15 RUE holding therabar with elbow bent   Thumb IP Place and Holds x 10 reps ea Did AROM with thumb extended and then in neutral by IF. Last position in palm do activily first then with place and hold. Added to HEP   Bean Grasp and Release  20 reps Scoop beans into full flexion wrist flexion and release into full digit and wrist extension (pull finger tips to nose)   FM Tasks  5 min Completed for AROM of digits and wrist with stretching incorporated:  -Turning over play cards   PROM/Stretching:      Wristforearm PROM All Planes x  W/ prolonged holds. Digit PROM x 15 rep ea Thumb and full fist with prolonged holds. Thumb abduction stretch with wrist ext to stretch FPL. Scar Mass/Edema Control:      Soft Tissue/Retrograde x  To loosen soft tissues and reduce edema in digits, hand, and wrist.   compression sleeve x  E on forearm and D wrist and proc forearm. Scar Manipulation x 20 mins To increase elasticity and prevent adhesions and interossious membrane rotation.    Therapeutic Activity: Strengthening:      Yellow putty    Lateral pinch, roll in hot dog and 3 pt pinch, gripping. Doing gripping only 1 min 4 x's day . Other:      Re-Assessment  20 min In-depth review of progress and HEP. Scar Pad x  Issued gel scar pad with compression sleeve E to hold pad in place over keloiding scar over radius. Pt educated to wear at night time. Ruiz strap   Ruiz trap issued to patient for prolonged stretching of thumb IP to assist with scar tissue management. Pt educated on how to properly don and to wear 5x/day for starting at 3 mins at a time for prolonged stretching with place and holds after completing. Kinesio Tape application   Completed 4 basket weave strips over scar sites to assist with edema management and scar management. Pt educated on wear time and how to remove tape if irritation should occur. HEP  x  Continue with and added thumb flexion IP place and hold and thumb stretch, FDS glide IF, SF adduc and abduction with #14 rubber band and thumb exer - see above. Revised program to not strain CT - no fisting, putty only 1 min 4 x's a day not full gripping. Assessment/Comments: Pt is making Good progress toward stated plan of care. Therapist changed her HEP- to put less stress on the median nerve - no full fisting and try to avoid resistive gripping. She is wearing the prefab splint at night and the reason she is wearing it was explained. -Rehab Potential: Good  -Requires OT Follow Up: Yes              Time In: 11:00 am           Time Out: 12:00 pm     Treatment Charges: Mins Units   Modalities- Fluido     Ther Exercise 25 2   Manual Therapy 15 1   Thera Activities 20 1   ADL/Home Mgt      Neuro Re-education     Gait Training     Group Therapy     Non-Billable Service Time     Other     Total Time/Units 60 4     -Response to Treatment: Pt tolerated session well and appeared to understand all new instructions.      Goal Progress: Goals for pt can be see on initial eval occurring on 11/17/2020 and on 1/18/2021   GOALS (Long term same as Short term):  1) Patient will demonstrate good understanding of home program (exercises/activities/diagnosis/prognosis/goals) with good accuracy. Progressing well, in-depth review of HEP with good understanding. 2) Patient will demonstrate increased active/passive range of motion of their RUE to [facility] for ADL/IADL completion. Progressing well, refer to measurements 1/18. 3) Patient will demonstrate increased /pinch strength of at least 10 / 5 pinch pounds of their R hand. Assessed today. 4) Patient to report decreased pain in their affected R upper extremity from 5/10 to 2/10 or less with resistive functional use. Progressing well, minimal to no pain reported on a daily basis. 5) Increase in fine motor function as evidenced by decreased time to complete 9-hole peg test and/or MRMT test by at least 5-10 seconds. Assessed today, time is functional.  6) Patient to demonstrate decreased guarding of their affected extremity from 100% to 20% or less. Progressing well, <50%. 7) Patient will be knowledgeable of edema control techniques as evident with decreases from moderate to mild/none. Goal met, refer to measurements. 8) Patient will demonstrate a non-tender/non-adherent scar. Progressing well, mild keloiding and dense scar tissue palpated over radial scar proximally. Scar pad issued 1/18. 9) Patient will report ADL functions as Mod I/I using RUE. Progressing well, mild difficulty with fine motor tasks - requires extended time. Mild difficulty with fastening, zipping, and buttoning. 10) Patient will demonstrate improved functional activity tolerance from poor to good for ADL/IADL completion. Progressing well, difficulty with picking up objects greater then 5 lbs such as a gallon of milk or opening a jar that requires ^'d  force of the digits.    11) Patient will decrease QuickDASH score to 30% or less for increased participation in daily functional activities. Progressing well, 81.8% to 59.1% disability. Plan:   [x]  Continues Plan of care: Continue 2x/week for 4-6 additional weeks to continue progress toward ROM, strengthening, fine motor, and overall improving functional use of her R UE. Treatment covered based on POC and graduated to patient's progress. Pt education continues at each visit to obtain maximum benefits from skilled OT intervention.   []  Alter Plan of care:   []  Discharge:    Lashonda Ley OT/L, T  OT 82

## 2021-01-25 ENCOUNTER — TREATMENT (OUTPATIENT)
Dept: OCCUPATIONAL THERAPY | Age: 51
End: 2021-01-25
Payer: COMMERCIAL

## 2021-01-25 DIAGNOSIS — S52.91XD: ICD-10-CM

## 2021-01-25 DIAGNOSIS — S52.91XD CLOSED FRACTURE OF SHAFT OF BONE OF RIGHT FOREARM WITH ROUTINE HEALING, SUBSEQUENT ENCOUNTER: Primary | ICD-10-CM

## 2021-01-25 PROCEDURE — 97140 MANUAL THERAPY 1/> REGIONS: CPT | Performed by: OCCUPATIONAL THERAPIST

## 2021-01-25 PROCEDURE — 97110 THERAPEUTIC EXERCISES: CPT | Performed by: OCCUPATIONAL THERAPIST

## 2021-01-25 PROCEDURE — 97022 WHIRLPOOL THERAPY: CPT | Performed by: OCCUPATIONAL THERAPIST

## 2021-01-25 NOTE — PROGRESS NOTES
OCCUPATIONAL THERAPY PROGRESS NOTE      Date:  2021  Initial Evaluation Date: 2020                          Evaluating Therapist: She Austin     Patient Name:  Conner Rashid \"Yomi\"                  :  1970     Restrictions/Precautions:  Evaluate and treat 1-3 times per week for 6-8 weeks   NWB on RUE at this time ( since 2021 WBAT)  Gentle ROM and strengthening, digit motion, scar management, desensitization, edema control, HEP, modalities and manual therapy as needed , low fall risk  Diagnosis: S52. 91XD (ICD-10-CM) - Closed fracture of shaft of bone of right forearm with routine healing, subsequent encounter                                                    Insurance/Certification information: [employer]  Referring Practitioner: Dr. Lakeisha Kirk  Date of Surgery/Injury: 10/17/2020   ( 13 weeks post op )  Plan of care signed (Y/N): Y  Visit# / total visits:  [phone] Screening completed upon entering facility and patient cleared for treatment today. Pt followed all protocols set forth by [address] [place] [address] for Outpatient services throughout therapy session. Patient has been made aware of all new hygiene protocols due to COVID-19 set forth by [address] [place] [address] Outpatient services and agrees to abide by all protocols. Pain Level: 0-2 /10 pain at rest and with light exercises, mild pain if at full stretch, aching in the fingers    Subjective:  Pt states \"The brace that  gave me is making my hand more numb and I'm getting swelling and discomfort on this side (ulnarly) of my arm. \"     Objective:  Updated POC to be completed by last session. INTERVENTION: COMPLETED: REPS/TIME: SPECIFICS/COMMENTS:   Modality:      Fluidotherapy  x 15 mins  to RUE with AROM encouraged. MHP  5 min At start of session for soft tissue warm up. AROM/AAROM:      Wrist All Planes AROM  10 reps ea Reviewed for HEP. Tendon Glides x 5 reps ea Reviewed for HEP.  No full fisting Elbow   x10 Completed with therapist assistance over elevated wedge and held extension range for 3-5 seconds. SF abduc/adduction x 10 rep's ea With rubber band around all fingers during abduction and hold away from [facility] fro adduction resistance. Added to HEP with size 14 rubber band to add resistance    Thumb All Planes AROM x 20 reps ea Radial/Atwood abduction, MP/IP flexion, opposition, IP/MP blocking. Added to HEP, provided with handout   FDS glide for IF  10 rep's  Hold other fingers down to isolate IF, after ending give resistance. Added to HEP    Prayer Stretches  15 reps W/ prolonged holds, added to HEP. Marker Ex's For Nerve and Tendon Gliding x 10 reps ea -Gentle full fist to hook fist with highlighter (do not squeeze tightly)  -Roll highlighter from hook fist down to flat fist and back up with tips of fingers (be sure to not exclude SF)   Wristisizer x 5 mins With RUE and elbow on table for wrist isolation. 1/2 with elbow on table and 1/2 Sitting away from table arm by side using wrist and forearm only. UBE x 5 min No resistance for median nerve gliding/stretch and to promote blood circulation. Towel Exercises  15 reps ea Fist scrunches, finger walks, and thumb scrunches. Supination/pronation  x15 RUE holding therabar with elbow bent   Thumb IP Place and Holds x 10 reps ea Did AROM with thumb extended and then in neutral by IF. Last position in palm do activily first then with place and hold. Added to HEP   Bean Grasp and Release  20 reps Scoop beans into full flexion wrist flexion and release into full digit and wrist extension (pull finger tips to nose)   FM Tasks  5 min Completed for AROM of digits and wrist with stretching incorporated:  -Turning over play cards   PROM/Stretching:      Wristforearm PROM All Planes x  W/ prolonged holds.    Median Nerve Stretching/Gliding x 10 reps ea -Hold hands in prayer, lift elbows, and move prayer position from side to side and drop low.  -Hold an item elasticity and scar tissue is loosening. Pt with increased pulling of median nerve with all median nerve stretches; however, reported relief following the exercises. Added to her HEP. Reviewed SF and thumb ex's from previous session with good follow through. Pt reported increased discomfort with pre-patrick wrist cock-up brace that was issued to her by . Discussed possible adjustments to make to brace to improve comfort and to bring it in with her next session for therapist to assess fit. -Rehab Potential: Good  -Requires OT Follow Up: Yes              Time In: 11:00 am           Time Out: 12:00 pm     Treatment Charges: Mins Units   Modalities- Fluido 15 1   Ther Exercise 30 2   Manual Therapy 15 1   Thera Activities     ADL/Home Mgt      Neuro Re-education     Gait Training     Group Therapy     Non-Billable Service Time     Other     Total Time/Units 60 4     -Response to Treatment: Pt tolerated session well. Goal Progress: Goals for pt can be see on initial eval occurring on 11/17/2020 and on 1/18/2021   GOALS (Long term same as Short term):  1) Patient will demonstrate good understanding of home program (exercises/activities/diagnosis/prognosis/goals) with good accuracy. Progressing well, in-depth review of HEP with good understanding. 2) Patient will demonstrate increased active/passive range of motion of their RUE to [facility] for ADL/IADL completion. Progressing well, refer to measurements 1/18. 3) Patient will demonstrate increased /pinch strength of at least 10 / 5 pinch pounds of their R hand. Assessed today. 4) Patient to report decreased pain in their affected R upper extremity from 5/10 to 2/10 or less with resistive functional use. Progressing well, minimal to no pain reported on a daily basis. 5) Increase in fine motor function as evidenced by decreased time to complete 9-hole peg test and/or MRMT test by at least 5-10 seconds.    Assessed today, time is functional.  6) Patient to demonstrate decreased guarding of their affected extremity from 100% to 20% or less. Progressing well, <50%. 7) Patient will be knowledgeable of edema control techniques as evident with decreases from moderate to mild/none. Goal met, refer to measurements. 8) Patient will demonstrate a non-tender/non-adherent scar. Progressing well, mild keloiding and dense scar tissue palpated over radial scar proximally. Scar pad issued 1/18. 9) Patient will report ADL functions as Mod I/I using RUE. Progressing well, mild difficulty with fine motor tasks - requires extended time. Mild difficulty with fastening, zipping, and buttoning. 10) Patient will demonstrate improved functional activity tolerance from poor to good for ADL/IADL completion. Progressing well, difficulty with picking up objects greater then 5 lbs such as a gallon of milk or opening a jar that requires ^'d  force of the digits. 11) Patient will decrease QuickDASH score to 30% or less for increased participation in daily functional activities. Progressing well, 81.8% to 59.1% disability. Plan:   [x]  Continues Plan of care: Continue 2x/week for 4-6 additional weeks to continue progress toward ROM, strengthening, fine motor, and overall improving functional use of her R UE. Treatment covered based on POC and graduated to patient's progress. Pt education continues at each visit to obtain maximum benefits from skilled OT intervention.   []  Alter Plan of care:   []  Discharge:    Autumn Alejandra Juan 89, OTR/L #[id number]

## 2021-01-28 ENCOUNTER — TREATMENT (OUTPATIENT)
Dept: OCCUPATIONAL THERAPY | Age: 51
End: 2021-01-28
Payer: COMMERCIAL

## 2021-01-28 DIAGNOSIS — S52.91XD CLOSED FRACTURE OF SHAFT OF BONE OF RIGHT FOREARM WITH ROUTINE HEALING, SUBSEQUENT ENCOUNTER: Primary | ICD-10-CM

## 2021-01-28 PROCEDURE — 97110 THERAPEUTIC EXERCISES: CPT | Performed by: OCCUPATIONAL THERAPIST

## 2021-01-28 PROCEDURE — 97140 MANUAL THERAPY 1/> REGIONS: CPT | Performed by: OCCUPATIONAL THERAPIST

## 2021-01-28 NOTE — PROGRESS NOTES
OCCUPATIONAL THERAPY PROGRESS NOTE      Date:  2021  Initial Evaluation Date: 2020                          Evaluating Therapist: Anand Avila     Patient Name:  Liset Gunter \"Yomi\"                  :  1970     Restrictions/Precautions:  Evaluate and treat 1-3 times per week for 6-8 weeks   NWB on RUE at this time ( since 2021 WBAT)  Gentle ROM and strengthening, digit motion, scar management, desensitization, edema control, HEP, modalities and manual therapy as needed , low fall risk  Diagnosis: S52. 91XD (ICD-10-CM) - Closed fracture of shaft of bone of right forearm with routine healing, subsequent encounter                                                    Insurance/Certification information: [employer] [address]  Referring Practitioner: Dr. Lazaro Espinosa  Date of Surgery/Injury: 10/17/2020   ( 13 weeks post op )  Plan of care signed (Y/N): Y  Visit# / total visits:  ) [phone] Screening completed upon entering facility and patient cleared for treatment today. Pt followed all protocols set forth by [address] [place] [address] for Outpatient services throughout therapy session. Patient has been made aware of all new hygiene protocols due to COVID-19 set forth by [address] [place] [address] Outpatient services and agrees to abide by all protocols. Pain Level: 3/10 throughout ulnar side with soreness reported. Subjective:  Pt states she has a hard time with full pronation. Objective:  Updated POC to be completed by last session. INTERVENTION: COMPLETED: REPS/TIME: SPECIFICS/COMMENTS:   Modality:      Fluidotherapy   15 mins  to RUE with AROM encouraged. MHP x 5 min At start of session for soft tissue warm up. AROM/AAROM:      Wrist All Planes AROM  10 reps ea Reviewed for HEP. Tendon Glides  5 reps ea Reviewed for HEP.  No full fisting   True balance x  5 mins RUE with fair tolerance   Elbow   x10 Completed with therapist assistance over elevated wedge and held extension well.   x5 each    Median Nerve Stretching/Gliding  10 reps ea -Hold hands in prayer, lift elbows, and move prayer position from side to side and drop low.  -Hold an item in hand to stimulate a plate (used box of tissues), moved UE around in various positions without tipping the plate.  -Extend arm into 90* flexion, then stretch wrist into extension with prolong hold. Digit PROM  15 rep ea Thumb and full fist with prolonged holds. Thumb abduction stretch with wrist ext to stretch FPL. Scar Mass/Edema Control:      Soft Tissue/Retrograde   To loosen soft tissues and reduce edema in digits, hand, and wrist.   compression sleeve Continues  E on forearm and D wrist and proc forearm. Scar Manipulation   To increase elasticity and prevent adhesions and interossious membrane rotation. Therapeutic Activity:                  Strengthening:      Yellow putty    Lateral pinch, roll in hot dog and 3 pt pinch, gripping. Doing gripping only 1 min 4 x's day . Other:      Re-Assessment  20 min In-depth review of progress and HEP. Scar Pad X - Continues w/ wear  Issued gel scar pad with compression sleeve E to hold pad in place over keloiding scar over radius. Pt educated to wear at night time. Ruiz strap   Ruiz trap issued to patient for prolonged stretching of thumb IP to assist with scar tissue management. Pt educated on how to properly don and to wear 5x/day for starting at 3 mins at a time for prolonged stretching with place and holds after completing. Compression sleeve under wrist brace x  - added compression sleeve from digits to elbow to place under wrist brace for full edema management to prevent build up of fluid. Kinesio Tape application   Completed 4 basket weave strips over scar sites to assist with edema management and scar management. Pt educated on wear time and how to remove tape if irritation should occur.     HEP  x  Continue with and added thumb flexion IP place and hold and thumb stretch, FDS glide IF, SF adduc and abduction with #14 rubber band and thumb exer - see above. Revised program to not strain CT - no fisting, putty only 1 min 4 x's a day not full gripping. Assessment/Comments: Pt is making Good progress toward stated plan of care. Wrist brace which was issued was brought in and compression sleeve issued from digits to forearms. Pt educated on donning/doffing brace and how to check if it was donned to tight/loose. Added new stretch to assist with stretching of forearm muscles more. Pt continues to tolerate all activities with brief     -Rehab Potential: Good  -Requires OT Follow Up: Yes              Time In: 11:00 am           Time Out: 12:00 pm     Treatment Charges: Mins Units   Modalities- Fluido     Ther Exercise 40 3   Manual Therapy 20 1   Thera Activities     ADL/Home Mgt      Neuro Re-education     Gait Training     Group Therapy     Non-Billable Service Time     Other     Total Time/Units 60 4     -Response to Treatment: Pt tolerated session well. Goal Progress: Goals for pt can be see on initial eval occurring on 11/17/2020 and on 1/18/2021   GOALS (Long term same as Short term):  1) Patient will demonstrate good understanding of home program (exercises/activities/diagnosis/prognosis/goals) with good accuracy. Progressing well, in-depth review of HEP with good understanding. 2) Patient will demonstrate increased active/passive range of motion of their RUE to [facility] for ADL/IADL completion. Progressing well, refer to measurements 1/18. 3) Patient will demonstrate increased /pinch strength of at least 10 / 5 pinch pounds of their R hand. Assessed today. 4) Patient to report decreased pain in their affected R upper extremity from 5/10 to 2/10 or less with resistive functional use. Progressing well, minimal to no pain reported on a daily basis.   5) Increase in fine motor function as evidenced by decreased time to complete 9-hole peg test and/or MRMT

## 2021-01-29 ENCOUNTER — TELEPHONE (OUTPATIENT)
Dept: ORTHOPEDIC SURGERY | Age: 51
End: 2021-01-29

## 2021-01-29 NOTE — TELEPHONE ENCOUNTER
Patient called into the office inquiring if we have filled out her [employer] paperwork for her job. Upon checking with Felecia Morrissey RN we have found we are not in the possession of any GM paperwork. Instructed Patient to please call her employer and give them our Fax number so the papers can be faxed to us. Patient agreeable with plan.

## 2021-02-01 ENCOUNTER — TREATMENT (OUTPATIENT)
Dept: OCCUPATIONAL THERAPY | Age: 51
End: 2021-02-01
Payer: COMMERCIAL

## 2021-02-01 DIAGNOSIS — S52.91XD CLOSED FRACTURE OF SHAFT OF BONE OF RIGHT FOREARM WITH ROUTINE HEALING, SUBSEQUENT ENCOUNTER: Primary | ICD-10-CM

## 2021-02-01 PROCEDURE — 97140 MANUAL THERAPY 1/> REGIONS: CPT | Performed by: OCCUPATIONAL THERAPIST

## 2021-02-01 PROCEDURE — 97022 WHIRLPOOL THERAPY: CPT | Performed by: OCCUPATIONAL THERAPIST

## 2021-02-01 PROCEDURE — 97110 THERAPEUTIC EXERCISES: CPT | Performed by: OCCUPATIONAL THERAPIST

## 2021-02-01 NOTE — PROGRESS NOTES
With rubber band around all fingers during abduction and hold away from [facility] fro adduction resistance. Added to HEP with size 14 rubber band to add resistance    Thumb All Planes AROM  20 reps ea Radial/[place] abduction, MP/IP flexion, opposition, IP/MP blocking. Added to HEP, provided with handout   FDS glide for IF  10 rep's  Hold other fingers down to isolate IF, after ending give resistance. Added to HEP    Prayer Stretches x 15 reps W/ prolonged holds, added to HEP. Marker Ex's For Nerve and Tendon Gliding x 10 reps ea -Gentle full fist to hook fist with highlighter (do not squeeze tightly)  -Roll highlighter from hook fist down to flat fist and back up with tips of fingers (be sure to not exclude SF)   Wristisizer x 5 mins With RUE and elbow on table for wrist isolation. 1/2 with elbow on table and 1/2 Sitting away from table arm by side using wrist and forearm only. UBE  5 min No resistance for median nerve gliding/stretch and to promote blood circulation. Towel Exercises x 10 reps ea Fist scrunches, finger walks, and thumb scrunches. Supination/pronation  x15 RUE holding therabar with elbow bent   Thumb IP Place and Holds  10 reps ea Did AROM with thumb extended and then in neutral by IF. Last position in palm do activily first then with place and hold. Added to HEP   Bean Grasp and Release  20 reps Scoop beans into full flexion wrist flexion and release into full digit and wrist extension (pull finger tips to nose)   Pom pom transfer x  -Completed 5 mins of transferring poms from one container to another with full wrist flexion and release into extension  -Completed with elbow on table for full flexion and extension   FM Tasks  5 min Completed for AROM of digits and wrist with stretching incorporated:  -Turning over play cards   PROM/Stretching:      Wrist & elbow forearm PROM All Planes   W/ prolonged holds.   - Added stretching with elbow : arm extended in front in supination, than hand to shoulder, then stretch back in elbow extension, rotate pronation radially to stretch forearm muscles. Pt than completed with arm starting in pronation as well. x5 each    Median Nerve Stretching/Gliding  10 reps ea -Hold hands in prayer, lift elbows, and move prayer position from side to side and drop low.  -Hold an item in hand to stimulate a plate (used box of tissues), moved UE around in various positions without tipping the plate.  -Extend arm into 90* flexion, then stretch wrist into extension with prolong hold. Digit PROM  15 rep ea Thumb and full fist with prolonged holds. Thumb abduction stretch with wrist ext to stretch FPL. Scar Mass/Edema Control:      Soft Tissue/Retrograde   To loosen soft tissues and reduce edema in digits, hand, and wrist.   compression sleeve Continues  E on forearm and D wrist and proc forearm. Scar Manipulation   To increase elasticity and prevent adhesions and interossious membrane rotation. Therapeutic Activity:                  Strengthening:      Yellow putty  x  Lateral pinch, roll in hot dog and 3 pt pinch, gripping. Doing gripping only 1 min 4 x's day . Other:      Re-Assessment  20 min In-depth review of progress and HEP. Scar Pad X - Continues w/ wear  Issued gel scar pad with compression sleeve E to hold pad in place over keloiding scar over radius. Pt educated to wear at night time. Ruzi strap   Ruiz trap issued to patient for prolonged stretching of thumb IP to assist with scar tissue management. Pt educated on how to properly don and to wear 5x/day for starting at 3 mins at a time for prolonged stretching with place and holds after completing. Compression sleeve under wrist brace x  - added compression sleeve from digits to elbow to place under wrist brace for full edema management to prevent build up of fluid. Kinesio Tape application   Completed 4 basket weave strips over scar sites to assist with edema management and scar management. Pt educated on wear time and how to remove tape if irritation should occur. HEP  x  Continue with and added thumb flexion IP place and hold and thumb stretch, FDS glide IF, SF adduc and abduction with #14 rubber band and thumb exer - see above. Revised program to not strain CT - no fisting, putty only 1 min 4 x's a day not full gripping. Assessment/Comments: Pt is making Good progress toward stated plan of care. Pt tolerated session well with increased breaks d/t poor endurance. Pt continues to report pain and numbness after completing long activities that require increased endurance. Will attempt to incorporate functional tasks with slight resistance to improve endurance/tolerance for activities. -Rehab Potential: Good  -Requires OT Follow Up: Yes              Time In: 11:00 am           Time Out: 12:00 pm     Treatment Charges: Mins Units   Modalities- Fluido 15 1   Ther Exercise 25 2   Manual Therapy 20 1   Thera Activities     ADL/Home Mgt      Neuro Re-education     Gait Training     Group Therapy     Non-Billable Service Time     Other     Total Time/Units 60 4     -Response to Treatment: Pt tolerated session well. Goal Progress: Goals for pt can be see on initial eval occurring on 11/17/2020 and on 1/18/2021   GOALS (Long term same as Short term):  1) Patient will demonstrate good understanding of home program (exercises/activities/diagnosis/prognosis/goals) with good accuracy. Progressing well, in-depth review of HEP with good understanding. 2) Patient will demonstrate increased active/passive range of motion of their RUE to [facility] for ADL/IADL completion. Progressing well, refer to measurements 1/18. 3) Patient will demonstrate increased /pinch strength of at least 10 / 5 pinch pounds of their R hand. Assessed today. 4) Patient to report decreased pain in their affected R upper extremity from 5/10 to 2/10 or less with resistive functional use.    Progressing well, minimal to no pain reported on a daily basis. 5) Increase in fine motor function as evidenced by decreased time to complete 9-hole peg test and/or MRMT test by at least 5-10 seconds. Assessed today, time is functional.  6) Patient to demonstrate decreased guarding of their affected extremity from 100% to 20% or less. Progressing well, <50%. 7) Patient will be knowledgeable of edema control techniques as evident with decreases from moderate to mild/none. Goal met, refer to measurements. 8) Patient will demonstrate a non-tender/non-adherent scar. Progressing well, mild keloiding and dense scar tissue palpated over radial scar proximally. Scar pad issued 1/18. 9) Patient will report ADL functions as Mod I/I using RUE. Progressing well, mild difficulty with fine motor tasks - requires extended time. Mild difficulty with fastening, zipping, and buttoning. 10) Patient will demonstrate improved functional activity tolerance from poor to good for ADL/IADL completion. Progressing well, difficulty with picking up objects greater then 5 lbs such as a gallon of milk or opening a jar that requires ^'d  force of the digits. 11) Patient will decrease QuickDASH score to 30% or less for increased participation in daily functional activities. Progressing well, 81.8% to 59.1% disability. Plan:   [x]  Continues Plan of care: Continue 2x/week for 4-6 additional weeks to continue progress toward ROM, strengthening, fine motor, and overall improving functional use of her R UE. Treatment covered based on POC and graduated to patient's progress. Pt education continues at each visit to obtain maximum benefits from skilled OT intervention.   []  Alter Plan of care:   []  Discharge:    Sierra Carrillo S/OT  Sneha Alejandra Juan 89, OTR/L #[id number]

## 2021-02-02 ENCOUNTER — TELEPHONE (OUTPATIENT)
Dept: ORTHOPEDIC SURGERY | Age: 51
End: 2021-02-02

## 2021-02-02 DIAGNOSIS — S52.91XA FOREARM FRACTURES, BOTH BONES, CLOSED, RIGHT, INITIAL ENCOUNTER: Primary | ICD-10-CM

## 2021-02-02 DIAGNOSIS — G56.11 MEDIAN NEUROPATHY, RIGHT: ICD-10-CM

## 2021-02-02 DIAGNOSIS — S52.201A FOREARM FRACTURES, BOTH BONES, CLOSED, RIGHT, INITIAL ENCOUNTER: Primary | ICD-10-CM

## 2021-02-02 NOTE — TELEPHONE ENCOUNTER
Pt left VM stating wearing brace is not helping wrist and still has periods of numbness during day. EMG order pended and routed for decision and signature.

## 2021-02-02 NOTE — TELEPHONE ENCOUNTER
As discussed at last office visit will obtain EMG/NCT for upper extremities.  Would recommend continuation of brace at bed time and anti-inflammatories  Electronically signed by Elmer Bush PA-C on 2/2/2021 at 2:39 PM

## 2021-02-08 ENCOUNTER — TREATMENT (OUTPATIENT)
Dept: OCCUPATIONAL THERAPY | Age: 51
End: 2021-02-08
Payer: COMMERCIAL

## 2021-02-08 DIAGNOSIS — S52.91XD CLOSED FRACTURE OF SHAFT OF BONE OF RIGHT FOREARM WITH ROUTINE HEALING, SUBSEQUENT ENCOUNTER: Primary | ICD-10-CM

## 2021-02-08 PROCEDURE — 97140 MANUAL THERAPY 1/> REGIONS: CPT | Performed by: OCCUPATIONAL THERAPIST

## 2021-02-08 PROCEDURE — 97022 WHIRLPOOL THERAPY: CPT | Performed by: OCCUPATIONAL THERAPIST

## 2021-02-08 PROCEDURE — 97110 THERAPEUTIC EXERCISES: CPT | Performed by: OCCUPATIONAL THERAPIST

## 2021-02-08 NOTE — PROGRESS NOTES
OCCUPATIONAL THERAPY PROGRESS NOTE      Date:  2021  Initial Evaluation Date: 2020                          Evaluating Therapist: Anand Avila     Patient Name:  Liset Gunter \"Yomi\"                  :  1970     Restrictions/Precautions:  Evaluate and treat 1-3 times per week for 6-8 weeks   NWB on RUE at this time ( since 2021 WBAT)  Gentle ROM and strengthening, digit motion, scar management, desensitization, edema control, HEP, modalities and manual therapy as needed , low fall risk  Diagnosis: S52. 91XD (ICD-10-CM) - Closed fracture of shaft of bone of right forearm with routine healing, subsequent encounter                                                    Insurance/Certification information: William Nelson  Referring Practitioner: Dr. Lazaro Espinosa  Date of Surgery/Injury: 10/17/2020   ( 13 weeks post op )  Plan of care signed (Y/N): Y  Visit# / total visits: 15 / [phone] Screening completed upon entering facility and patient cleared for treatment today. Pt followed all protocols set forth by [facility] for Outpatient services throughout therapy session. Patient has been made aware of all new hygiene protocols due to COVID-19 set forth by [facility] Outpatient services and agrees to abide by all protocols. Pain Level: No pain reported. Reported thumb and IF are always numb and that whole hand falls asleep with continued use (after 1 hour). Subjective:  Pt reports no new changes. Objective:  Updated POC to be completed by last session. INTERVENTION: COMPLETED: REPS/TIME: SPECIFICS/COMMENTS:   Modality:      Fluidotherapy  x 15 mins  to RUE with AROM encouraged. MHP  5 min At start of session for soft tissue warm up. AROM/AAROM:      Wrist All Planes AROM  10 reps ea Reviewed for HEP. Tendon Glides  5 reps ea Reviewed for HEP.  No full fisting   True balance   5 mins RUE with fair tolerance   Elbow   x10 Completed with therapist assistance over elevated wedge and held extension range for 3-5 seconds. SF abduc/adduction  10 rep's ea With rubber band around all fingers during abduction and hold away from [facility] fro adduction resistance. Added to HEP with size 14 rubber band to add resistance    Thumb All Planes AROM  20 reps ea Radial/[place]wood abduction, MP/IP flexion, opposition, IP/MP blocking. Added to HEP, provided with handout   FDS glide for IF  10 rep's  Hold other fingers down to isolate IF, after ending give resistance. Added to HEP    Prayer Stretches x 15 reps W/ prolonged holds, added to HEP. Marker Ex's For Nerve and Tendon Gliding  10 reps ea -Gentle full fist to hook fist with highlighter (do not squeeze tightly)  -Roll highlighter from hook fist down to flat fist and back up with tips of fingers (be sure to not exclude SF)   Wristisizer x 5 mins With RUE and elbow on table for wrist isolation. 1/2 with elbow on table and 1/2 Sitting away from table arm by side using wrist and forearm only. UBE  5 min No resistance for median nerve gliding/stretch and to promote blood circulation. Towel Exercises x 10 reps ea Fist scrunches, finger walks, and thumb scrunches. Supination/pronation  x15 RUE holding therabar with elbow bent   Thumb IP Place and Holds  10 reps ea Did AROM with thumb extended and then in neutral by IF. Last position in palm do activily first then with place and hold.   Added to HEP   Bean Grasp and Release  20 reps Scoop beans into full flexion wrist flexion and release into full digit and wrist extension (pull finger tips to nose)   Pom pom transfer x 10 mins -Completed 5 mins of transferring poms from one container to another with full wrist flexion and release into extension  -Completed with elbow on table for full flexion and extension    -Completed with fist around cone to scoop out of one container and into another with emphasis on wrist flex/ext  -Completed transfer with small end of cone to  medium poms and pronate hand to pour into container through larger end of cone   FM Tasks  5 min Completed for AROM of digits and wrist with stretching incorporated:  -Turning over play cards   PROM/Stretching:      Wrist & elbow forearm PROM All Planes   W/ prolonged holds. - Added stretching with elbow : arm extended in front in supination, than hand to shoulder, then stretch back in elbow extension, rotate pronation radially to stretch forearm muscles. Pt than completed with arm starting in pronation as well. x5 each    Active fist with sponges x 20 reps Completed full fists with pink sponges between each digit (except thumb) to open up dorsal hand creating more space between MCP joints in an attempt to decrease numbness/tingling in R UE. Added to HEP. Completed between exercises throughout session. Median Nerve Stretching/Gliding  10 reps ea -Hold hands in prayer, lift elbows, and move prayer position from side to side and drop low.  -Hold an item in hand to stimulate a plate (used box of tissues), moved UE around in various positions without tipping the plate.  -Extend arm into 90* flexion, then stretch wrist into extension with prolong hold. Digit PROM  15 rep ea Thumb and full fist with prolonged holds. Thumb abduction stretch with wrist ext to stretch FPL. Scar Mass/Edema Control:      Soft Tissue/Retrograde   To loosen soft tissues and reduce edema in digits, hand, and wrist.   compression sleeve Continues  E on forearm and D wrist and proc forearm. Scar Manipulation   To increase elasticity and prevent adhesions and interossious membrane rotation. Therapeutic Activity:                  Strengthening:      Yellow putty  x  Lateral pinch, roll in hot dog and 3 pt pinch, gripping. Doing gripping only 1 min 4 x's day. Added putty twists (both directions) to simulate movements involved in opening containers         Other:      Re-Assessment  20 min In-depth review of progress and HEP.    Scar management.     -Rehab Potential: Good  -Requires OT Follow Up: Yes              Time In: 11:00 am           Time Out: 12:00 pm     Treatment Charges: Mins Units   Modalities- Fluido 15 1   Ther Exercise 25 2   Manual Therapy 20 1   Thera Activities     ADL/Home Mgt      Neuro Re-education     Gait Training     Group Therapy     Non-Billable Service Time     Other     Total Time/Units 60 4     -Response to Treatment: Pt tolerated session well. Goal Progress: Goals for pt can be see on initial eval occurring on 11/17/2020 and on 1/18/2021   GOALS (Long term same as Short term):  1) Patient will demonstrate good understanding of home program (exercises/activities/diagnosis/prognosis/goals) with good accuracy. Progressing well, in-depth review of HEP with good understanding. 2) Patient will demonstrate increased active/passive range of motion of their RUE to [facility] for ADL/IADL completion. Progressing well, refer to measurements 1/18. 3) Patient will demonstrate increased /pinch strength of at least 10 / 5 pinch pounds of their R hand. Assessed today. 4) Patient to report decreased pain in their affected R upper extremity from 5/10 to 2/10 or less with resistive functional use. Progressing well, minimal to no pain reported on a daily basis. 5) Increase in fine motor function as evidenced by decreased time to complete 9-hole peg test and/or MRMT test by at least 5-10 seconds. Assessed today, time is functional.  6) Patient to demonstrate decreased guarding of their affected extremity from 100% to 20% or less. Progressing well, <50%. 7) Patient will be knowledgeable of edema control techniques as evident with decreases from moderate to mild/none. Goal met, refer to measurements. 8) Patient will demonstrate a non-tender/non-adherent scar. Progressing well, mild keloiding and dense scar tissue palpated over radial scar proximally. Scar pad issued 1/18.   9) Patient will report ADL functions as Mod I/I using RUE. Progressing well, mild difficulty with fine motor tasks - requires extended time. Mild difficulty with fastening, zipping, and buttoning. 10) Patient will demonstrate improved functional activity tolerance from poor to good for ADL/IADL completion. Progressing well, difficulty with picking up objects greater then 5 lbs such as a gallon of milk or opening a jar that requires ^'d  force of the digits. 11) Patient will decrease QuickDASH score to 30% or less for increased participation in daily functional activities. Progressing well, 81.8% to 59.1% disability. Plan:   [x]  Continues Plan of care: Continue 2x/week for 4-6 additional weeks to continue progress toward ROM, strengthening, fine motor, and overall improving functional use of her R UE. Treatment covered based on POC and graduated to patient's progress. Pt education continues at each visit to obtain maximum benefits from skilled OT intervention.   []  Alter Plan of care:   []  Discharge:    Violet Deluna S/OT  Naz Alejandra Juan 89, OTR/L #[id number]

## 2021-02-10 ENCOUNTER — TELEPHONE (OUTPATIENT)
Dept: ORTHOPEDIC SURGERY | Age: 51
End: 2021-02-10

## 2021-02-10 DIAGNOSIS — S52.91XA FOREARM FRACTURES, BOTH BONES, CLOSED, RIGHT, INITIAL ENCOUNTER: Primary | ICD-10-CM

## 2021-02-10 DIAGNOSIS — S52.201A FOREARM FRACTURES, BOTH BONES, CLOSED, RIGHT, INITIAL ENCOUNTER: Primary | ICD-10-CM

## 2021-02-10 DIAGNOSIS — G56.11 MEDIAN NEUROPATHY, RIGHT: ICD-10-CM

## 2021-02-10 NOTE — TELEPHONE ENCOUNTER
Referral sent to PM&R since no one from neurology has reached out to patient to date  Electronically signed by Nicole Jon PA-C on 2/10/2021 at 3:03 PM

## 2021-02-10 NOTE — TELEPHONE ENCOUNTER
Pt left VM requesting call back regarding scheduling EMG, states no one has reached out to her yet. Order pended and routed for decision and signature.

## 2021-02-11 ENCOUNTER — TREATMENT (OUTPATIENT)
Dept: OCCUPATIONAL THERAPY | Age: 51
End: 2021-02-11
Payer: COMMERCIAL

## 2021-02-11 DIAGNOSIS — S52.91XD: ICD-10-CM

## 2021-02-11 DIAGNOSIS — S52.91XD CLOSED FRACTURE OF SHAFT OF BONE OF RIGHT FOREARM WITH ROUTINE HEALING, SUBSEQUENT ENCOUNTER: Primary | ICD-10-CM

## 2021-02-11 PROCEDURE — 97110 THERAPEUTIC EXERCISES: CPT | Performed by: OCCUPATIONAL THERAPIST

## 2021-02-11 PROCEDURE — 97140 MANUAL THERAPY 1/> REGIONS: CPT | Performed by: OCCUPATIONAL THERAPIST

## 2021-02-11 PROCEDURE — 97022 WHIRLPOOL THERAPY: CPT | Performed by: OCCUPATIONAL THERAPIST

## 2021-02-11 NOTE — PROGRESS NOTES
OCCUPATIONAL THERAPY PROGRESS NOTE      Date:  2021  Initial Evaluation Date: 2020                          Evaluating Therapist: Emily Andrew     Patient Name:  Sinai Dominguez \"Yomi\"                  :  1970     Restrictions/Precautions:  Evaluate and treat 1-3 times per week for 6-8 weeks   NWB on RUE at this time ( since 2021 WBAT)  Gentle ROM and strengthening, digit motion, scar management, desensitization, edema control, HEP, modalities and manual therapy as needed , low fall risk  Diagnosis: S52. 91XD (ICD-10-CM) - Closed fracture of shaft of bone of right forearm with routine healing, subsequent encounter                                                    Insurance/Certification information: Triston Kenney  Referring Practitioner: Dr. Alisa Camejo  Date of Surgery/Injury: 10/17/2020   ( 13 weeks post op )  Plan of care signed (Y/N): Y  Visit# / total visits: 15 / [phone] Screening completed upon entering facility and patient cleared for treatment today. Pt followed all protocols set forth by [address] [place] [address] for Outpatient services throughout therapy session. Patient has been made aware of all new hygiene protocols due to COVID-19 set forth by [address] [place] [address] Outpatient services and agrees to abide by all protocols. Pain Level: No pain reported. Reported thumb and IF are always numb and that whole hand falls asleep with continued use (after 1 hour). Subjective:  Pt reports decreased tightness around scar site from where the tape was. Objective:  Updated POC to be completed by last session. INTERVENTION: COMPLETED: REPS/TIME: SPECIFICS/COMMENTS:   Modality:      Fluidotherapy  x 15 mins  to RUE with AROM encouraged. MHP  5 min At start of session for soft tissue warm up. AROM/AAROM:      Wrist All Planes AROM  10 reps ea Reviewed for HEP. Tendon Glides  5 reps ea Reviewed for HEP.  No full fisting   True balance   5 mins RUE with fair tolerance   Elbow   x10 Completed with therapist assistance over elevated wedge and held extension range for 3-5 seconds. SF abduc/adduction  10 rep's ea With rubber band around all fingers during abduction and hold away from [facility] fro adduction resistance. Added to HEP with size 14 rubber band to add resistance    Thumb All Planes AROM  20 reps ea Radial/[place] abduction, MP/IP flexion, opposition, IP/MP blocking. Added to HEP, provided with handout   FDS glide for IF  10 rep's  Hold other fingers down to isolate IF, after ending give resistance. Added to HEP    Prayer Stretches  15 reps W/ prolonged holds, added to HEP. Marker Ex's For Nerve and Tendon Gliding  10 reps ea -Gentle full fist to hook fist with highlighter (do not squeeze tightly)  -Roll highlighter from hook fist down to flat fist and back up with tips of fingers (be sure to not exclude SF)   Wristisizer x 5 mins With RUE and elbow on table for wrist isolation. 1/2 with elbow on table and 1/2 Sitting away from table arm by side using wrist and forearm only. UBE  5 min No resistance for median nerve gliding/stretch and to promote blood circulation. Towel Exercises x 10 reps ea Fist scrunches, finger walks, and thumb scrunches with focus on IP flexion. Supination/pronation  x15 RUE holding therabar with elbow bent   Thumb IP Place and Holds  10 reps ea Did AROM with thumb extended and then in neutral by IF. Last position in palm do activily first then with place and hold.   Added to HEP   Bean Grasp and Release  20 reps Scoop beans into full flexion wrist flexion and release into full digit and wrist extension (pull finger tips to nose)   Pom pom transfer  10 mins -Completed 5 mins of transferring poms from one container to another with full wrist flexion and release into extension  -Completed with elbow on table for full flexion and extension    -Completed with fist around cone to scoop out of one container and into another with emphasis on wrist flex/ext  -Completed transfer with small end of cone to  medium poms and pronate hand to pour into container through larger end of cone   FM Tasks  5 min Completed for AROM of digits and wrist with stretching incorporated:  -Turning over play cards   PROM/Stretching:      Wrist & elbow forearm PROM All Planes   W/ prolonged holds. - Added stretching with elbow : arm extended in front in supination, than hand to shoulder, then stretch back in elbow extension, rotate pronation radially to stretch forearm muscles. Pt than completed with arm starting in pronation as well. x5 each    Active fist with sponges  20 reps Completed full fists with pink sponges between each digit (except thumb) to open up dorsal hand creating more space between MCP joints in an attempt to decrease numbness/tingling in R UE. Added to HEP. Completed between exercises throughout session. Median Nerve Stretching/Gliding  10 reps ea -Hold hands in prayer, lift elbows, and move prayer position from side to side and drop low.  -Hold an item in hand to stimulate a plate (used box of tissues), moved UE around in various positions without tipping the plate.  -Extend arm into 90* flexion, then stretch wrist into extension with prolong hold. Digit PROM  15 rep ea Thumb and full fist with prolonged holds. Thumb abduction stretch with wrist ext to stretch FPL. Scar Mass/Edema Control:      Soft Tissue/Retrograde x  To loosen soft tissues and reduce edema in digits, hand, and wrist.   compression sleeve Continues  E on forearm and D wrist and proc forearm. Scar Manipulation x  To increase elasticity and prevent adhesions and interossious membrane rotation. Therapeutic Activity:                  Strengthening:      Yellow putty  x  Lateral pinch, roll in hot dog and 3 pt pinch, gripping. Doing gripping only 1 min 4 x's day.   Added putty twists (both directions) to simulate movements involved in opening containers  - IP flexion: pressing into putty and pulling back x6  - flattening out with RUE  - Putty tools using orange putty : x5 clock-wise & x5 counter clock-wise door knob, bottle cap, key turn, peg turn         Other:      Re-Assessment  20 min In-depth review of progress and HEP. Scar Pad X - Continues w/ wear  Issued gel scar pad with compression sleeve E to hold pad in place over keloiding scar over radius. Pt educated to wear at night time. Ruiz strap   Ruiz trap issued to patient for prolonged stretching of thumb IP to assist with scar tissue management. Pt educated on how to properly don and to wear 5x/day for starting at 3 mins at a time for prolonged stretching with place and holds after completing. Compression sleeve under wrist brace x  - added compression sleeve from digits to elbow to place under wrist brace for full edema management to prevent build up of fluid. Kinesio Tape application   Cross friction application for scar tissue management. One strip down legnth of middle scar with 50% -75% tension and 3 crossing overtop with tension for facilitation of cross friction. Edema taping on either side of scar ( 2 basket weave strips  for lymphatic direction)   HEP  x  Continue with and added thumb flexion IP place and hold and thumb stretch, FDS glide IF, SF adduc and abduction with #14 rubber band and thumb exer - see above. Revised program to not strain CT - no fisting, putty only 1 min 4 x's a day not full gripping. Assessment/Comments: Pt is making Good progress toward stated plan of care. Pt tolerated session well with focus on scar tissue management, stretching and improved motion of digits. Pt continues to report numbness in her had with functional use of RUE. Pt reports no changes using sponges for stretching the back of the hand and was educated to monitor how her RUE feels tonight after session today ( is there increased or decreased numbness).      -Rehab Potential: Good  -Requires OT Follow Up: Yes              Time In: 11:00 am           Time Out: 12:00 pm     Treatment Charges: Mins Units   Modalities- Fluido 15 1   Ther Exercise 25 2   Manual Therapy 20 1   Thera Activities     ADL/Home Mgt      Neuro Re-education     Gait Training     Group Therapy     Non-Billable Service Time     Other     Total Time/Units 60 4     -Response to Treatment: Pt tolerated session well. Goal Progress: Goals for pt can be see on initial eval occurring on 11/17/2020 and on 1/18/2021   GOALS (Long term same as Short term):  1) Patient will demonstrate good understanding of home program (exercises/activities/diagnosis/prognosis/goals) with good accuracy. Progressing well, in-depth review of HEP with good understanding. 2) Patient will demonstrate increased active/passive range of motion of their RUE to [facility] for ADL/IADL completion. Progressing well, refer to measurements 1/18. 3) Patient will demonstrate increased /pinch strength of at least 10 / 5 pinch pounds of their R hand. Assessed today. 4) Patient to report decreased pain in their affected R upper extremity from 5/10 to 2/10 or less with resistive functional use. Progressing well, minimal to no pain reported on a daily basis. 5) Increase in fine motor function as evidenced by decreased time to complete 9-hole peg test and/or MRMT test by at least 5-10 seconds. Assessed today, time is functional.  6) Patient to demonstrate decreased guarding of their affected extremity from 100% to 20% or less. Progressing well, <50%. 7) Patient will be knowledgeable of edema control techniques as evident with decreases from moderate to mild/none. Goal met, refer to measurements. 8) Patient will demonstrate a non-tender/non-adherent scar. Progressing well, mild keloiding and dense scar tissue palpated over radial scar proximally. Scar pad issued 1/18. 9) Patient will report ADL functions as Mod I/I using RUE.    Progressing well, mild difficulty with fine motor tasks - requires extended time. Mild difficulty with fastening, zipping, and buttoning. 10) Patient will demonstrate improved functional activity tolerance from poor to good for ADL/IADL completion. Progressing well, difficulty with picking up objects greater then 5 lbs such as a gallon of milk or opening a jar that requires ^'d  force of the digits. 11) Patient will decrease QuickDASH score to 30% or less for increased participation in daily functional activities. Progressing well, 81.8% to 59.1% disability. Plan:   [x]  Continues Plan of care: Continue 2x/week for 4-6 additional weeks to continue progress toward ROM, strengthening, fine motor, and overall improving functional use of her R UE. Treatment covered based on POC and graduated to patient's progress. Pt education continues at each visit to obtain maximum benefits from skilled OT intervention.   []  Alter Plan of care:   []  Discharge:    Sneha Alejandra Juan 89, OTR/L #[id number]

## 2021-02-15 ENCOUNTER — TREATMENT (OUTPATIENT)
Dept: OCCUPATIONAL THERAPY | Age: 51
End: 2021-02-15

## 2021-02-15 NOTE — PROGRESS NOTES
OCCUPATIONAL THERAPY PROGRESS NOTE      Date:  2/15/2021  Initial Evaluation Date: 2020                          Evaluating Therapist: Isabelle Quinones     Patient Name:  Lee Gilbert \"Yomi\"                  :  1970     Restrictions/Precautions:  Evaluate and treat 1-3 times per week for 6-8 weeks   NWB on RUE at this time ( since 2021 WBAT)  Gentle ROM and strengthening, digit motion, scar management, desensitization, edema control, HEP, modalities and manual therapy as needed , low fall risk  Diagnosis: S52. 91XD (ICD-10-CM) - Closed fracture of shaft of bone of right forearm with routine healing, subsequent encounter                                                    Insurance/Certification information: [employer]aina Branch [employer]  Referring Practitioner: Dr. Ranae Landau  Date of Surgery/Injury: 10/17/2020   ( 13 weeks post op )  Plan of care signed (Y/N): Y  Visit# / total visits:  ) [id number] Screening completed upon entering facility and patient cleared for treatment today. Pt followed all protocols set forth by [facility] for Outpatient services throughout therapy session. Patient has been made aware of all new hygiene protocols due to COVID-19 set forth by [facility] Outpatient services and agrees to abide by all protocols. Pain Level: No pain reported. Reported thumb and IF are always numb and that whole hand falls asleep with continued use (after 1 hour). Subjective:  Pt reports decreased tightness around scar site from where the tape was. Objective:  Updated POC to be completed by last session. INTERVENTION: COMPLETED: REPS/TIME: SPECIFICS/COMMENTS:   Modality:      Fluidotherapy  x 15 mins  to RUE with AROM encouraged. MHP  5 min At start of session for soft tissue warm up. AROM/AAROM:      Wrist All Planes AROM  10 reps ea Reviewed for HEP. Tendon Glides  5 reps ea Reviewed for HEP.  No full fisting   True balance   5 mins RUE with fair tolerance   Elbow   x10 Completed with therapist assistance over elevated wedge and held extension range for 3-5 seconds. Red ball activity   Completed rolling of red ball up tall end of incline using tips of digits for emphasis on wrist flex/extension. Once to top of incline place ball into cup with radial/ulnar deviation. Then  cup and drop ball into hand with emphasis on pronation/supination and then use tips of digits to roll ball back down tall end of incline. x20 reps- fair tolerance    SF abduc/adduction  10 rep's ea With rubber band around all fingers during abduction and hold away from [facility] fro adduction resistance. Added to HEP with size 14 rubber band to add resistance    Thumb All Planes AROM  20 reps ea Radial/[place] abduction, MP/IP flexion, opposition, IP/MP blocking. Added to HEP, provided with handout   FDS glide for IF  10 rep's  Hold other fingers down to isolate IF, after ending give resistance. Added to HEP    Prayer Stretches  15 reps W/ prolonged holds, added to HEP. Marker Ex's For Nerve and Tendon Gliding  10 reps ea -Gentle full fist to hook fist with highlighter (do not squeeze tightly)  -Roll highlighter from hook fist down to flat fist and back up with tips of fingers (be sure to not exclude SF)   Wristisizer x 5 mins With RUE and elbow on table for wrist isolation. 1/2 with elbow on table and 1/2 Sitting away from table arm by side using wrist and forearm only. UBE  5 min No resistance for median nerve gliding/stretch and to promote blood circulation. Towel Exercises x 10 reps ea Fist scrunches, finger walks, and thumb scrunches with focus on IP flexion. Supination/pronation  x15 RUE holding therabar with elbow bent   Thumb IP Place and Holds  10 reps ea Did AROM with thumb extended and then in neutral by IF. Last position in palm do activily first then with place and hold.   Added to HEP   Bean Grasp and Release  20 reps Scoop beans into full flexion wrist flexion and release into full digit and wrist extension (pull finger tips to nose)   Pom pom transfer  10 mins -Completed 5 mins of transferring poms from one container to another with full wrist flexion and release into extension  -Completed with elbow on table for full flexion and extension    -Completed with fist around cone to scoop out of one container and into another with emphasis on wrist flex/ext  -Completed transfer with small end of cone to  medium poms and pronate hand to pour into container through larger end of cone   FM Tasks  5 min Completed for AROM of digits and wrist with stretching incorporated:  -Turning over play cards   PROM/Stretching:      Wrist & elbow forearm PROM All Planes   W/ prolonged holds. - Added stretching with elbow : arm extended in front in supination, than hand to shoulder, then stretch back in elbow extension, rotate pronation radially to stretch forearm muscles. Pt than completed with arm starting in pronation as well. x5 each    Active fist with sponges  20 reps Completed full fists with pink sponges between each digit (except thumb) to open up dorsal hand creating more space between MCP joints in an attempt to decrease numbness/tingling in R UE. Added to HEP. Completed between exercises throughout session. Median Nerve Stretching/Gliding  10 reps ea -Hold hands in prayer, lift elbows, and move prayer position from side to side and drop low.  -Hold an item in hand to stimulate a plate (used box of tissues), moved UE around in various positions without tipping the plate.  -Extend arm into 90* flexion, then stretch wrist into extension with prolong hold. Digit PROM  15 rep ea Thumb and full fist with prolonged holds. Thumb abduction stretch with wrist ext to stretch FPL. Scar Mass/Edema Control:      Soft Tissue/Retrograde x  To loosen soft tissues and reduce edema in digits, hand, and wrist.   compression sleeve Continues  E on forearm and D wrist and proc forearm. Scar Manipulation x  To increase elasticity and prevent adhesions and interossious membrane rotation. Therapeutic Activity:                  Strengthening:      Yellow putty  x  Lateral pinch, roll in hot dog and 3 pt pinch, gripping. Doing gripping only 1 min 4 x's day. Added putty twists (both directions) to simulate movements involved in opening containers  - IP flexion: pressing into putty and pulling back x6  - flattening out with RUE  - Putty tools using orange putty : x5 clock-wise & x5 counter clock-wise door knob, bottle cap, key turn, peg turn         Other:      Re-Assessment  20 min In-depth review of progress and HEP. Scar Pad X - Continues w/ wear  Issued gel scar pad with compression sleeve E to hold pad in place over keloiding scar over radius. Pt educated to wear at night time. Ruiz strap   Ruiz trap issued to patient for prolonged stretching of thumb IP to assist with scar tissue management. Pt educated on how to properly don and to wear 5x/day for starting at 3 mins at a time for prolonged stretching with place and holds after completing. Compression sleeve under wrist brace x  - added compression sleeve from digits to elbow to place under wrist brace for full edema management to prevent build up of fluid. Kinesio Tape application   Cross friction application for scar tissue management. One strip down legnth of middle scar with 50% -75% tension and 3 crossing overtop with tension for facilitation of cross friction. Edema taping on either side of scar ( 2 basket weave strips  for lymphatic direction)   HEP  x  Continue with and added thumb flexion IP place and hold and thumb stretch, FDS glide IF, SF adduc and abduction with #14 rubber band and thumb exer - see above. Revised program to not strain CT - no fisting, putty only 1 min 4 x's a day not full gripping. Assessment/Comments: Pt is making Good progress toward stated plan of care.  Pt tolerated session well ***  with focus on scar tissue management, stretching and improved motion of digits. Pt continues to report numbness in her had with functional use of RUE. Pt reports no changes using sponges for stretching the back of the hand and was educated to monitor how her RUE feels tonight after session today ( is there increased or decreased numbness). -Rehab Potential: Good  -Requires OT Follow Up: Yes              Time In: 2:00 pm           Time Out: 3:00 pm     Treatment Charges: Mins Units   Modalities- Fluido 15 1   Ther Exercise 25 2   Manual Therapy 20 1   Thera Activities     ADL/Home Mgt      Neuro Re-education     Gait Training     Group Therapy     Non-Billable Service Time     Other     Total Time/Units 60 4     -Response to Treatment: Pt tolerated session well. Goal Progress: Goals for pt can be see on initial eval occurring on 11/17/2020 and on 1/18/2021   GOALS (Long term same as Short term):  1) Patient will demonstrate good understanding of home program (exercises/activities/diagnosis/prognosis/goals) with good accuracy. Progressing well, in-depth review of HEP with good understanding. 2) Patient will demonstrate increased active/passive range of motion of their RUE to [facility] for ADL/IADL completion. Progressing well, refer to measurements 1/18. 3) Patient will demonstrate increased /pinch strength of at least 10 / 5 pinch pounds of their R hand. Assessed today. 4) Patient to report decreased pain in their affected R upper extremity from 5/10 to 2/10 or less with resistive functional use. Progressing well, minimal to no pain reported on a daily basis. 5) Increase in fine motor function as evidenced by decreased time to complete 9-hole peg test and/or MRMT test by at least 5-10 seconds. Assessed today, time is functional.  6) Patient to demonstrate decreased guarding of their affected extremity from 100% to 20% or less. Progressing well, <50%.   7) Patient will be knowledgeable of edema

## 2021-02-16 ENCOUNTER — HOSPITAL ENCOUNTER (OUTPATIENT)
Dept: NEUROLOGY | Age: 51
Discharge: HOME | End: 2021-02-16
Payer: COMMERCIAL

## 2021-02-16 VITALS — BODY MASS INDEX: 24.99 KG/M2 | HEIGHT: 65 IN | WEIGHT: 150 LBS

## 2021-02-16 DIAGNOSIS — G56.11 MEDIAN NEUROPATHY, RIGHT: ICD-10-CM

## 2021-02-16 DIAGNOSIS — S52.91XA FOREARM FRACTURES, BOTH BONES, CLOSED, RIGHT, INITIAL ENCOUNTER: ICD-10-CM

## 2021-02-16 DIAGNOSIS — S52.201A FOREARM FRACTURES, BOTH BONES, CLOSED, RIGHT, INITIAL ENCOUNTER: ICD-10-CM

## 2021-02-16 PROCEDURE — 95886 MUSC TEST DONE W/N TEST COMP: CPT

## 2021-02-16 PROCEDURE — 95911 NRV CNDJ TEST 9-10 STUDIES: CPT

## 2021-02-16 NOTE — PROCEDURES
[facility]  [address], [address]  Phone: [phone]  Fax: [phone]      Referring Provider: Kallie Lentz, *  Primary Care Physician: Cleveland Vasquez MD  Patient Name: Joeann Phoenix  Patient YOB: 1970  Gender: female  BMI: Body mass index is 24.96 kg/m². Height 5' 5\" (1.651 m), weight 150 lb (68 kg), last menstrual period 06/10/2013.    2/16/2021    Description of clinical problem:   No chief complaint on file. Pain No   ; Numbness/tingling  Yes; Weakness  No       Brief physical exam:   Sensory deficit Yes; Weakness No; Atrophy  No; Reflex abnormality No    Study Limitations: None    Summary of Findings:   Nerve conduction studies:   · The following nerve conduction studies were abnormal:   · Prolongation of right median distal motor latency. · All other nerve conduction studies listed in the table above were normal in latency, amplitude and conduction velocity. Usman  22.   Electrodiagnostic Laboratory  [place]        Full Name: Joeann Phoenix Gender: Female  MRN: [id number] YOB: 1970  Location[de-identified] [facility] (3)      Visit Date: 2/16/2021 11:46  Age: 52 Years 6 Months Old  Examining Physician: Dr. Michael Ray   Referring Physician: Dr. Morel Daily  Technician: Radha Rosas   Height: 5 feet 5 inch  Weight: 150 lbs  Notes: Median neuropathy (G56.11) Bilater upper      Motor NCS      Nerve / Sites Lat. Amplitude Distance Lat Diff Velocity Temp. Amp. 1-2    ms mV cm ms m/s °C %   R Median - APB      Wrist 4.84 2.4 8   32.9 100      Elbow 8.65 2.4 19 3.80 50 33 102   L Median - APB      Wrist 3.70 7.6 8   32.2 100      Elbow 7.14 7.4 18 3.44 52 32.2 97.3   R Ulnar - ADM      Wrist 2.50 9.8 8   32.6 100      B. Elbow 5.21 9.3 17 2.71 63 32.9 95.3      A. Elbow 6.82 9.1 10 1.61 62 32.9 92.5   L Ulnar - ADM      Wrist 3.07 9.6 8   32 100      B. Elbow 5.78 8.9 17 2.71 63 32 92.4 A.Elbow 7.50 8.6 10 1.72 58 32.1 89.8       Sensory NCS      Nerve / Sites Onset Lat Peak Lat PP Amp Distance Velocity Temp.    ms ms µV cm m/s °C   R Median - Digit II (Antidromic)      Mid Palm 1.09 1.82 12.4 7 64 33.1      Wrist 3.33 3.91 9.5 14 42 33.3   L Median - Digit II (Antidromic)      Mid Palm 1.09 1.67 91.1 7 64 31.7      Wrist 2.81 3.49 71.7 14 50 31.7   R Ulnar - Digit V (Antidromic)      Wrist 2.29 2.97 31.8 14 61 33.1   L Ulnar - Digit V (Antidromic)      Wrist 2.29 3.18 44.1 14 61 31.6   R Radial - Anatomical snuff box (Forearm)      Forearm 1.82 2.45 23.5 10 55 33.1   L Radial - Anatomical snuff box (Forearm)      Forearm 1.61 2.19 29.8 10 62 31.7       F  Wave      Nerve F Lat M Lat F-M Lat    ms ms ms   R Median - APB 36.1 4.7 31.4   R Ulnar - ADM 24.2 2.7 21.5   L Median - APB 25.2 3.7 21.5   L Ulnar - ADM 24.8 3.1 21.7       EMG         EMG Summary Table     Spontaneous MUAP Recruitment   Muscle IA Fib PSW Fasc H.F. Amp Dur. PPP Pattern   R. Abductor pollicis brevis N None None None None N N N N   R. First dorsal interosseous N None None None None N N N N   R. Brachioradialis N None None None None N N N N   R. Biceps brachii N None None None None N N N N   R. Deltoid N None None None None N N N N   R. Triceps brachii N None None None None N N N N                 ·     Needle EMG:   · Needle EMG was performed using a monopolar needle. · The following abnormalities were seen on needle EMG: None  All other muscles tested, as listed in the table above demonstrated normal amplitude, duration, phases and recruitment and no active denervation signs were seen. Diagnostic Interpretation:   Mild to moderate right carpal tunnel syndrome with moderate reduction in median compound muscle potential amplitude; possible contribution of right forearm surgical plate. Clinical correlation advised.     Technologist: Buzz Saldana  Physician: Scott Peñaloza MD    Nerve conduction studies and electromyography were performed according to our laboratory policies and procedures which can be provided upon request. All abnormal values are identified in the table.  Laboratory normal values can also be provided upon request.       Cc: Anthony Butcher, *  Shmuel Vivas MD

## 2021-02-18 ENCOUNTER — TREATMENT (OUTPATIENT)
Dept: OCCUPATIONAL THERAPY | Age: 51
End: 2021-02-18
Payer: COMMERCIAL

## 2021-02-18 DIAGNOSIS — S52.91XD CLOSED FRACTURE OF SHAFT OF BONE OF RIGHT FOREARM WITH ROUTINE HEALING, SUBSEQUENT ENCOUNTER: Primary | ICD-10-CM

## 2021-02-18 PROCEDURE — 97110 THERAPEUTIC EXERCISES: CPT | Performed by: OCCUPATIONAL THERAPIST

## 2021-02-18 PROCEDURE — 97022 WHIRLPOOL THERAPY: CPT | Performed by: OCCUPATIONAL THERAPIST

## 2021-02-18 PROCEDURE — 97140 MANUAL THERAPY 1/> REGIONS: CPT | Performed by: OCCUPATIONAL THERAPIST

## 2021-02-18 NOTE — PROGRESS NOTES
OCCUPATIONAL THERAPY PROGRESS NOTE      Date:  2021  Initial Evaluation Date: 2020                          Evaluating Therapist: Kayleen Montanez     Patient Name:  Sharrie Mohs \"Yomi\"                  :  1970     Restrictions/Precautions:  Evaluate and treat 1-3 times per week for 6-8 weeks   NWB on RUE at this time ( since 2021 WBAT)  Gentle ROM and strengthening, digit motion, scar management, desensitization, edema control, HEP, modalities and manual therapy as needed , low fall risk  Diagnosis: S52. 91XD (ICD-10-CM) - Closed fracture of shaft of bone of right forearm with routine healing, subsequent encounter                                                    Insurance/Certification information: Naina Branch 150  Referring Practitioner: Dr. Ana Lilia Gibbs  Date of Surgery/Injury: 10/17/2020   ( 18 weeks post op )  Plan of care signed (Y/N): Y  Visit# / total visits:  [phone] Screening completed upon entering facility and patient cleared for treatment today. Pt followed all protocols set forth by [facility] for Outpatient services throughout therapy session. Patient has been made aware of all new hygiene protocols due to COVID-19 set forth by [facility] Outpatient services and agrees to abide by all protocols. Pain Level: No pain reported. Reported thumb and IF are always numb and that whole hand falls asleep with continued use (after 1 hour). Subjective:  Pt reports no new changes. Objective:  Updated POC to be completed by last session. INTERVENTION: COMPLETED: REPS/TIME: SPECIFICS/COMMENTS:   Modality:      Fluidotherapy  x 15 mins  to RUE with AROM encouraged. MHP  5 min At start of session for soft tissue warm up. AROM/AAROM:      Wrist All Planes AROM  10 reps ea Reviewed for HEP. Tendon Glides  5 reps ea Reviewed for HEP.  No full fisting   True balance   5 mins RUE with fair tolerance   Elbow   x10 Completed with therapist assistance over elevated wedge and held extension range for 3-5 seconds. Red ball activity   Completed rolling of red ball up tall end of incline using tips of digits for emphasis on wrist flex/extension. Once to top of incline place ball into cup with radial/ulnar deviation. Then  cup and drop ball into hand with emphasis on pronation/supination and then use tips of digits to roll ball back down tall end of incline. x20 reps- fair tolerance    SF abduc/adduction  10 rep's ea With rubber band around all fingers during abduction and hold away from [facility] fro adduction resistance. Added to HEP with size 14 rubber band to add resistance    Thumb All Planes AROM  20 reps ea Radial/[place] abduction, MP/IP flexion, opposition, IP/MP blocking. Added to HEP, provided with handout   FDS glide for IF  10 rep's  Hold other fingers down to isolate IF, after ending give resistance. Added to HEP    Prayer Stretches  15 reps W/ prolonged holds, added to HEP. Marker Ex's For Nerve and Tendon Gliding  10 reps ea -Gentle full fist to hook fist with highlighter (do not squeeze tightly)  -Roll highlighter from hook fist down to flat fist and back up with tips of fingers (be sure to not exclude SF)   Wristisizer  5 mins With RUE and elbow on table for wrist isolation. 1/2 with elbow on table and 1/2 Sitting away from table arm by side using wrist and forearm only. UBE  5 min No resistance for median nerve gliding/stretch and to promote blood circulation. Towel Exercises x 10 reps ea Fist scrunches, finger walks, and thumb scrunches with focus on IP flexion. Supination/pronation  x15 RUE holding therabar with elbow bent   Thumb IP Place and Holds  10 reps ea Did AROM with thumb extended and then in neutral by IF. Last position in palm do activily first then with place and hold.   Added to HEP   Bean Grasp and Release  20 reps Scoop beans into full flexion wrist flexion and release into full digit and wrist extension (pull finger tips to nose)   Pom pom transfer x 10 mins -Completed 5 mins of transferring poms from one container to another with full wrist flexion and release into extension  -Completed with elbow on table for full flexion and extension    -Completed with fist around cone to scoop out of one container and into another with emphasis on wrist flex/ext and pronation/supination    -Completed transfer with small end of cone to  medium poms and pronate hand to pour into container through larger end of cone   FM Tasks  5 min Completed for AROM of digits and wrist with stretching incorporated:  -Turning over play cards   PROM/Stretching:      Wrist & elbow forearm PROM All Planes   W/ prolonged holds. - Added stretching with elbow : arm extended in front in supination, than hand to shoulder, then stretch back in elbow extension, rotate pronation radially to stretch forearm muscles. Pt than completed with arm starting in pronation as well. x5 each    Active fist with sponges  20 reps Completed full fists with pink sponges between each digit (except thumb) to open up dorsal hand creating more space between MCP joints in an attempt to decrease numbness/tingling in R UE. Added to HEP. Completed between exercises throughout session. Median Nerve Stretching/Gliding x 10 reps ea -Hold hands in prayer, lift elbows, and move prayer position from side to side and drop low.    -Prayer stretch and median nerve gliding added to HEP with handout    -Hold an item in hand to stimulate a plate (used box of tissues), moved UE around in various positions without tipping the plate.  -Extend arm into 90* flexion, then stretch wrist into extension with prolong hold. Digit PROM  15 rep ea Thumb and full fist with prolonged holds. Thumb abduction stretch with wrist ext to stretch FPL.     Scar Mass/Edema Control:      Soft Tissue/Retrograde   To loosen soft tissues and reduce edema in digits, hand, and wrist.   compression sleeve stated plan of care. Pt tolerated session well with continued numbness and tingling in R hand reported. Jordyn gal completed this date with decreased sensation demonstrated in R thumb, IF and MF. Educated to limit gripping, use ice, and adjust night splint into neutral wrist position for increased sensation. Median nerve gliding exercises and prayer stretches added to HEP (with handout) for decreased nerve compression and fair carryover demonstrated. Jose G Correia (2/18/2021): Thumb  4.31  IF  4.31  MF 3.61    -Rehab Potential: Good  -Requires OT Follow Up: Yes              Time In: 11:00 am           Time Out: 12:00 pm     Treatment Charges: Mins Units   Modalities- Fluido 15 1   Ther Exercise 25 2   Manual Therapy 20 1   Thera Activities     ADL/Home Mgt      Neuro Re-education     Gait Training     Group Therapy     Non-Billable Service Time     Other     Total Time/Units 60 4     -Response to Treatment: Pt tolerated session well. Goal Progress: Goals for pt can be see on initial eval occurring on 11/17/2020 and on 1/18/2021   GOALS (Long term same as Short term):  1) Patient will demonstrate good understanding of home program (exercises/activities/diagnosis/prognosis/goals) with good accuracy. Progressing well, in-depth review of HEP with good understanding. 2) Patient will demonstrate increased active/passive range of motion of their RUE to [facility] for ADL/IADL completion. Progressing well, refer to measurements 1/18. 3) Patient will demonstrate increased /pinch strength of at least 10 / 5 pinch pounds of their R hand. Assessed today. 4) Patient to report decreased pain in their affected R upper extremity from 5/10 to 2/10 or less with resistive functional use. Progressing well, minimal to no pain reported on a daily basis. 5) Increase in fine motor function as evidenced by decreased time to complete 9-hole peg test and/or MRMT test by at least 5-10 seconds.    Assessed

## 2021-02-25 ENCOUNTER — TREATMENT (OUTPATIENT)
Dept: OCCUPATIONAL THERAPY | Age: 51
End: 2021-02-25
Payer: COMMERCIAL

## 2021-02-25 DIAGNOSIS — S52.91XD CLOSED FRACTURE OF SHAFT OF BONE OF RIGHT FOREARM WITH ROUTINE HEALING, SUBSEQUENT ENCOUNTER: Primary | ICD-10-CM

## 2021-02-25 PROCEDURE — 97022 WHIRLPOOL THERAPY: CPT | Performed by: OCCUPATIONAL THERAPIST

## 2021-02-25 PROCEDURE — 97035 APP MDLTY 1+ULTRASOUND EA 15: CPT | Performed by: OCCUPATIONAL THERAPIST

## 2021-02-25 PROCEDURE — 97140 MANUAL THERAPY 1/> REGIONS: CPT | Performed by: OCCUPATIONAL THERAPIST

## 2021-02-25 PROCEDURE — 97110 THERAPEUTIC EXERCISES: CPT | Performed by: OCCUPATIONAL THERAPIST

## 2021-02-25 NOTE — PROGRESS NOTES
OCCUPATIONAL THERAPY PROGRESS NOTE      Date:  2021  Initial Evaluation Date: 2020                          Evaluating Therapist: Brionna Nguyen     Patient Name:  Lupe Cisneros \"Yomi\"                  :  1970     Restrictions/Precautions:  Evaluate and treat 1-3 times per week for 6-8 weeks   NWB on RUE at this time ( since 2021 WBAT)  Gentle ROM and strengthening, digit motion, scar management, desensitization, edema control, HEP, modalities and manual therapy as needed , low fall risk  Diagnosis: S52. 91XD (ICD-10-CM) - Closed fracture of shaft of bone of right forearm with routine healing, subsequent encounter                                                    Insurance/Certification information: [employer] [place] [employer]  Referring Practitioner: Dr. Francisco Lo  Date of Surgery/Injury: 10/17/2020   ( 19 weeks post op  )  Plan of care signed (Y/N): Y  Visit# / total visits:  [phone] Screening completed upon entering facility and patient cleared for treatment today. Pt followed all protocols set forth by [facility] for Outpatient services throughout therapy session. Patient has been made aware of all new hygiene protocols due to COVID-19 set forth by [facility] Outpatient services and agrees to abide by all protocols. Pain Level: No pain reported. Reported thumb and IF are always numb and that whole hand falls asleep with continued use (after 1 hour). Subjective:  Pt reports that not using her hand decreases the numbness/tingling but that it comes back when RUE is used for extended time. Objective:  Updated POC to be completed by last session. INTERVENTION: COMPLETED: REPS/TIME: SPECIFICS/COMMENTS:   Modality:      Fluidotherapy  x 15 mins To RUE with AROM encouraged for soft tissue warmup. US x  Completed on R wrist and thumb for scar tissue, pain/numbness management.    Intensity: 0.8  Duration: 8 minutes (4 mins on forearm scar, 4 mins over carpal tunnel)  Duty Cycle: 100%  Depth: 3.3 MHz   MHP  5 min At start of session for soft tissue warm up. AROM/AAROM:      Wrist All Planes AROM  10 reps ea Reviewed for HEP. Tendon Glides x 5 reps ea Reviewed for HEP. True balance   5 mins RUE with fair tolerance   Elbow   x10 Completed with therapist assistance over elevated wedge and held extension range for 3-5 seconds. Red ball activity   Completed rolling of red ball up tall end of incline using tips of digits for emphasis on wrist flex/extension. Once to top of incline place ball into cup with radial/ulnar deviation. Then  cup and drop ball into hand with emphasis on pronation/supination and then use tips of digits to roll ball back down tall end of incline. x20 reps- fair tolerance    SF abduc/adduction  10 rep's ea With rubber band around all fingers during abduction and hold away from [facility] fro adduction resistance. Added to HEP with size 14 rubber band to add resistance    Thumb All Planes AROM  20 reps ea Radial/[place] abduction, MP/IP flexion, opposition, IP/MP blocking. Added to HEP, provided with handout   FDS glide for IF  10 rep's  Hold other fingers down to isolate IF, after ending give resistance. Added to HEP    Prayer Stretches  15 reps W/ prolonged holds, added to HEP. Marker Ex's For Nerve and Tendon Gliding  10 reps ea -Gentle full fist to hook fist with highlighter (do not squeeze tightly)  -Roll highlighter from hook fist down to flat fist and back up with tips of fingers (be sure to not exclude SF)   Wristisizer  5 mins With RUE and elbow on table for wrist isolation. 1/2 with elbow on table and 1/2 Sitting away from table arm by side using wrist and forearm only. UBE  5 min No resistance for median nerve gliding/stretch and to promote blood circulation. Towel Exercises  10 reps ea Fist scrunches, finger walks, and thumb scrunches with focus on IP flexion.     Supination/pronation  x15 RUE holding therabar with elbow bent   Thumb IP Place and Holds  10 reps ea Did AROM with thumb extended and then in neutral by IF. Last position in palm do activily first then with place and hold. Added to HEP   Bean Grasp and Release  20 reps Scoop beans into full flexion wrist flexion and release into full digit and wrist extension (pull finger tips to nose)   Pom pom transfer x 5 mins -Completed 5 mins of transferring poms to/from one container to table with full wrist flexion and release into extension (with elbow on table for full flexion)    -Completed with fist around cone to scoop out of one container and into another with emphasis on wrist flex/ext and pronation/supination    -Completed transfer with small end of cone to  medium poms and pronate hand to pour into container through larger end of cone   FM Tasks x 5 min Completed for AROM of digits and wrist with stretching incorporated:  -Turning over play cards    Stringing beads with emphasis on \"O's\"- fair tolerance (increased fatigue with rest breaks needed)   PROM/Stretching:      Wrist & elbow forearm PROM All Planes   W/ prolonged holds. - Added stretching with elbow : arm extended in front in supination, than hand to shoulder, then stretch back in elbow extension, rotate pronation radially to stretch forearm muscles. Pt than completed with arm starting in pronation as well. x5 each    Active fist with sponges  20 reps Completed full fists with pink sponges between each digit (except thumb) to open up dorsal hand creating more space between MCP joints in an attempt to decrease numbness/tingling in R UE. Added to HEP. Completed between exercises throughout session.     Median Nerve Stretching/Gliding x 10 reps ea -Hold hands in prayer, lift elbows, and move prayer position from side to side and drop low.    -Prayer stretch and median nerve gliding added to HEP with handout    -Hold an item in hand to stimulate a plate (used box of tissues), moved UE around in various positions without tipping the plate.  -Extend arm into 90* flexion, then stretch wrist into extension with prolong hold. Digit PROM  15 rep ea Thumb and full fist with prolonged holds. Thumb abduction stretch with wrist ext to stretch FPL. Scar Mass/Edema Control:      Soft Tissue/Retrograde x 7 mins To loosen soft tissues and reduce edema in digits, hand, and wrist. Completed carpal tunnel stretch this date. compression sleeve Continues  E on forearm and D wrist and proc forearm. Scar Manipulation x 3 minutes To increase elasticity and prevent adhesions and interossious membrane rotation. Therapeutic Activity:                  Strengthening:      Yellow putty    Lateral pinch, roll in hot dog and 3 pt pinch, gripping. Doing gripping only 1 min 4 x's day. Added putty twists (both directions) to simulate movements involved in opening containers  - IP flexion: pressing into putty and pulling back x6  - flattening out with RUE  - Putty tools using orange putty : x5 clock-wise & x5 counter clock-wise door knob, bottle cap, key turn, peg turn   Isometric exercise x 1 minute 2x each direction Completed isometric shaking of yellow therabar for ^strength and decreased compression of median nerve for management of tingling/numbing sensation. Other:      Re-Assessment  20 min In-depth review of progress and HEP. Scar Pad X - Continues w/ wear  Issued gel scar pad with compression sleeve E to hold pad in place over keloiding scar over radius. Pt educated to wear at night time. Ruiz strap   Ruiz trap issued to patient for prolonged stretching of thumb IP to assist with scar tissue management. Pt educated on how to properly don and to wear 5x/day for starting at 3 mins at a time for prolonged stretching with place and holds after completing.     Compression sleeve under wrist brace x  continues- compression sleeve from digits to elbow to place under wrist brace for full edema management to prevent build up of fluid.    Kinesio Tape application   Cross friction application for scar tissue management. One strip down legnth of middle scar with 50% -75% tension and 3 crossing overtop with tension for facilitation of cross friction. Edema taping on either side of scar ( 2 basket weave strips  for lymphatic direction)   HEP  x  Continue with and added thumb flexion IP place and hold and thumb stretch, FDS glide IF, SF adduc and abduction with #14 rubber band and thumb exer - see above. Revised program to not strain CT - no fisting, putty only 1 min 4 x's a day not full gripping. Assessment/Comments: Pt is making Good progress toward stated plan of care. Pt tolerated session fair with isometric exercises and US completed to decrease continued feeling of numbness/tingling in R UE. Decreased endurance demonstrated in fine motor activities with rest breaks required throughout session for activity completion. Numbness/tingling continues to persist with no changes. Pt to be seen by doctor next week and contact if discharged from therapy or if additional visits are needed before returning to work on 3/8/2021. Gonzalo Willis (2/18/2021): Thumb  4.31  IF  4.31  MF 3.61    -Rehab Potential: Good  -Requires OT Follow Up: Yes              Time In: 11:00 am           Time Out: 12:00 pm     Treatment Charges: Mins Units   Modalities- Fluido/US 15/8 1/1   Ther Exercise 22 1   Manual Therapy 15 1   Thera Activities     ADL/Home Mgt      Neuro Re-education     Gait Training     Group Therapy     Non-Billable Service Time     Other     Total Time/Units 60 4     -Response to Treatment: Pt tolerated session well. Goal Progress: Goals for pt can be see on initial eval occurring on 11/17/2020 and on 1/18/2021   GOALS (Long term same as Short term):  1) Patient will demonstrate good understanding of home program (exercises/activities/diagnosis/prognosis/goals) with good accuracy.    Progressing well, in-depth review of HEP with good understanding. 2) Patient will demonstrate increased active/passive range of motion of their RUE to [facility] for ADL/IADL completion. Progressing well, refer to measurements 1/18. 3) Patient will demonstrate increased /pinch strength of at least 10 / 5 pinch pounds of their R hand. Assessed today. 4) Patient to report decreased pain in their affected R upper extremity from 5/10 to 2/10 or less with resistive functional use. Progressing well, minimal to no pain reported on a daily basis. 5) Increase in fine motor function as evidenced by decreased time to complete 9-hole peg test and/or MRMT test by at least 5-10 seconds. Assessed today, time is functional.  6) Patient to demonstrate decreased guarding of their affected extremity from 100% to 20% or less. Progressing well, <50%. 7) Patient will be knowledgeable of edema control techniques as evident with decreases from moderate to mild/none. Goal met, refer to measurements. 8) Patient will demonstrate a non-tender/non-adherent scar. Progressing well, mild keloiding and dense scar tissue palpated over radial scar proximally. Scar pad issued 1/18. 9) Patient will report ADL functions as Mod I/I using RUE. Progressing well, mild difficulty with fine motor tasks - requires extended time. Mild difficulty with fastening, zipping, and buttoning. 10) Patient will demonstrate improved functional activity tolerance from poor to good for ADL/IADL completion. Progressing well, difficulty with picking up objects greater then 5 lbs such as a gallon of milk or opening a jar that requires ^'d  force of the digits. 11) Patient will decrease QuickDASH score to 30% or less for increased participation in daily functional activities. Progressing well, 81.8% to 59.1% disability.     Plan:   [x]  Continues Plan of care: Continue 2x/week for 4-6 additional weeks to continue progress toward ROM, strengthening, fine motor, and overall improving functional use of her R UE. Treatment covered based on POC and graduated to patient's progress. Pt education continues at each visit to obtain maximum benefits from skilled OT intervention.   []  Alter Plan of care:   []  Discharge:    [facility] S/OT  Autumn Alejandra Juan 89, OTR/L #[id number]

## 2021-02-26 DIAGNOSIS — S52.91XA FOREARM FRACTURES, BOTH BONES, CLOSED, RIGHT, INITIAL ENCOUNTER: Primary | ICD-10-CM

## 2021-02-26 DIAGNOSIS — S52.201A FOREARM FRACTURES, BOTH BONES, CLOSED, RIGHT, INITIAL ENCOUNTER: Primary | ICD-10-CM

## 2021-03-03 ENCOUNTER — HOSPITAL ENCOUNTER (OUTPATIENT)
Dept: GENERAL RADIOLOGY | Age: 51
Discharge: HOME | End: 2021-03-05
Payer: COMMERCIAL

## 2021-03-03 ENCOUNTER — OFFICE VISIT (OUTPATIENT)
Dept: ORTHOPEDIC SURGERY | Age: 51
End: 2021-03-03
Payer: COMMERCIAL

## 2021-03-03 VITALS — TEMPERATURE: 98.9 F

## 2021-03-03 DIAGNOSIS — S52.91XA FOREARM FRACTURES, BOTH BONES, CLOSED, RIGHT, INITIAL ENCOUNTER: ICD-10-CM

## 2021-03-03 DIAGNOSIS — G56.01 CARPAL TUNNEL SYNDROME OF RIGHT WRIST: ICD-10-CM

## 2021-03-03 DIAGNOSIS — Z11.59 SCREENING FOR VIRAL DISEASE: ICD-10-CM

## 2021-03-03 DIAGNOSIS — S52.201A FOREARM FRACTURES, BOTH BONES, CLOSED, RIGHT, INITIAL ENCOUNTER: ICD-10-CM

## 2021-03-03 DIAGNOSIS — S52.91XD CLOSED FRACTURE OF SHAFT OF BONE OF RIGHT FOREARM WITH ROUTINE HEALING, SUBSEQUENT ENCOUNTER: Primary | ICD-10-CM

## 2021-03-03 PROCEDURE — 99214 OFFICE O/P EST MOD 30 MIN: CPT | Performed by: PHYSICIAN ASSISTANT

## 2021-03-03 PROCEDURE — 99212 OFFICE O/P EST SF 10 MIN: CPT | Performed by: ORTHOPAEDIC SURGERY

## 2021-03-03 PROCEDURE — 73090 X-RAY EXAM OF FOREARM: CPT

## 2021-03-03 NOTE — PROGRESS NOTES
Lupe Cisneros is a 52 y.o. female who presents for follow up of 6 week f/u Right forearm ORIF     SURGEON: Dr. Francisco Lo MD  Date of Injury/Surgery: 10-  Date last seen in office: 1-    Symptoms: unchanged  New complaints: Continues to have numbness to right hand digits 1, 2 and 3. States digits 1 and 2 are\"annoying\" numb, \"my whole hand still goes numb. Cast/Splint, Brace, or Dressings: sleeps with velcro splint on. Weightbearing: right upper Full weight bearing      Assistive device No Device  Participating in therapy (location if yes)? No, just finished yesterday.     Refills Needed: None  Order/Referral Needed: unsure

## 2021-03-03 NOTE — PROGRESS NOTES
worsened with her attempts to continue with her normal ADLs. Patient feels that if she does not use her night splints pain, N/T worsens. She continues to have clumsiness with the right hand. Patient has had some increased sensitivity over the ulnar plate nearest her elbow. Denies any new fall or injury. Denies any other orthopedic complaints. Review of Systems   Constitutional: Negative for fever, chills, diaphoresis, appetite change and fatigue. HENT: Negative for dental issues, hearing loss and tinnitus. Negative for congestion, sinus pressure, sneezing, sore throat. Negative for headache. Eyes: Negative for visual disturbance, blurred and double vision. Negative for pain, discharge, redness and itching  Respiratory: Negative for cough, shortness of breath and wheezing. Cardiovascular: Negative for chest pain, palpitations and leg swelling. No dyspnea on exertion   Gastrointestinal:   Negative for nausea, vomiting, abdominal pain, diarrhea, constipation  or black or bloody. Hematologic\Lymphatic:  negative for bleeding, petechiae,   Genitourinary: Negative for hematuria and difficulty urinating. Musculoskeletal: Negative for neck pain and stiffness. Negative for back pain, see HPI  Skin: Negative for pallor, rash and wound. Neurological: Negative for dizziness, tremors, seizures, weakness, light-headedness, no TIA or stroke symptoms. No numbness and headaches. Psychiatric/Behavioral: Negative. OBJECTIVE:      Physical Examination:   General appearance: alert, well appearing, and in no distress,  normal appearing weight.  No visible signs of trauma   Mental status: alert, oriented to person, place, and time, normal mood, behavior, speech, dress, motor activity, and thought processes  Abdomen: soft, nondistended  Resp:   resp easy and unlabored, no audible wheezes note, normal symmetrical expansion of both hemithoraces  Cardiac: distal pulses palpable, skin and extremities well perfused  Neurological: alert, oriented X3, normal speech, no focal findings or movement disorder noted, motor and sensory grossly normal bilaterally, normal muscle tone, no tremors, strength 5/5, normal gait and station  HEENT: normochephalic atraumatic, external ears and eyes normal, sclera normal, neck supple, no nasal discharge. Extremities:   peripheral pulses normal, no edema, redness or tenderness in the calves   Skin: normal coloration, no rashes or open wounds, no suspicious skin lesions noted  Psych: Affect euthymic   Musculoskeletal:   Extremity:  Right Upper Extremity  Skin is clean dry and intact no new rashes, lesions or evidence of skin breakdown  no edema noted  2+ Radial pulse, fingers warm with BCR  Flex/extension intact to wrist, thumb and fingers   Finger opposition intact  Finger adduction/abduction intact  Patient does have some weakness with little digit adduction, patient is able to complete but increased effort to complete  Finger crossover intact  able to make concentric fist  Patient able to perform full supination and pronation  Subjectively states sensation intact to Ulnar Nerve and Radial Nerve distribution. Significant paraesthesias to the median nerve distribution. No thenar atrophy noted when compared to contralateral side   +Tinels   + Durkins  Incision(s) well healed, no signs of infection, hypertrophic or keloid changes. She is using silicon scar strips to the forearm incisions  Patient has TTP to the forearm at the most proximal aspect of her ulnar plate. Temp 98.9 °F (37.2 °C) (Oral)   LMP 06/10/2013      XR: 3/3/21 AP/Lateral views of the right radius/ulna demonstrates plate and screw fixation to the radius and ulna shafts. There is no evidence of hardware failure or loosening. Radial shaft fracture appears well healed. There is persistent visualization of ulnar shaft fracture.  No other acute osseous abnormality     Reviewed prior testing:  EMG/NCV: Date: 2/16/21 Diagnostic Interpretation:   Mild to moderate right carpal tunnel syndrome with moderate reduction in median compound muscle potential amplitude; possible contribution of right forearm surgical plate. Clinical correlation advised. ASSESSMENT:     Diagnosis Orders   1. Closed fracture of shaft of bone of right forearm with routine healing, subsequent encounter     2. Screening for viral disease  COVID-19 Ambulatory   3. Carpal tunnel syndrome of right wrist         Discussion: Had lengthy discussion with patient regarding Her diagnosis, typical prognosis, and expected outcomes. I reviewed the possible complications from the injury itself despite treatment choosen. I also discussed treatment options including nonoperative managements versus surgical management, along with risks and benefits of each. They have elected for surgical management at this time. We discussed for CTR at this time, would like to continue to monitor fracture healing prior to pursuing removal of hardware. Risk, benefits and treatment options discussed with Kaleigh Harris. She has verbalized understanding of options. The possibility of complications were also discussed to include but not limited to nerve damage, infection, problems with wound healing, vascular injury, chronic pain, stiffness, dysfunction, nonhealing of the bone, symptomatic hardware and/or its failure, need for subsequent surgery, dislocation, and blood clots as well as medical related problems and other problems not specifically discussed. Risk of anesthesia also discussed to include death. Post-op care, work, activity and restrictions which included the use of pain medication and possibility of using blood thinner post op were also discussed with Kaleigh Harris and she verbalized and agreed with the restrictions. PLAN:  1.  Your surgery is scheduled for Right Carpal Tunnel Release at 3/11/2021 at 10:00 AM  with Dr. Ashok Jenkins MD at the main [facility].

## 2021-03-03 NOTE — PATIENT INSTRUCTIONS
1. Your surgery is scheduled for Right Carpal Tunnel Release at 3/11/2021 at 10:00 AM  with Dr. Sanaz Dobson MD at the main [address] [facility] in [place] . You will need to report to Preop area  that morning at 8:30 AM    2. You are having Outpatient surgery so you will be returning home the same day  3. Preadmission Testing (PAT) department at [facility] will contact you with all the details prior to surgery. 4. Nothing to eat or drink after midnight the night before surgery. You may take a pain pill and any other medicine PAT instructs you to take with small sip of water if needed. 5. COVID 19 testing pre op  6. Continue with ice and elevation to reduce swelling  7. Activity as tolerated with right upper extremity  8. Take pain medicine as instructed  9. Call office with any question or concerns: [phone]. Hold ASA the day of surgery. Hold all NSAIDs 3 days prior to surgery    [address] We need to have COVID-19 Testing done 4 days (not including Sunday) prior to your scheduled surgery     After your testing is completed you will need to Self Quarantine until date of surgery     If your surgery is scheduled for:  Thursday- Testing needs to be done Friday      Locations and hours are listed below: These sites will not test anyone without a physician order. The order can be in [facility] or can be a paper order. [address] [place] [address]. Hours of Operation - Monday - Friday 6:00am - 2:30pm.   Juliette Amaya [facility] [address] [facility] [place], [address] [place] [address]. Hours of Operation - Monday - Friday 6:00am - 2:30pm. ([facility])   [address] [place] [id number]., [address], [address] [place] [address].  Hours of Operation - Monday - Friday 6:00am - 2:30pm.      You will follow white signs that say SURGERY TESTING   Please bring a valid photo ID with you   Please bring order for COVID 19 test with you   Pre-Admission Testing will also contact you to review COVID 19 testing and protocol

## 2021-03-04 ENCOUNTER — PREP FOR PROCEDURE (OUTPATIENT)
Dept: ORTHOPEDIC SURGERY | Age: 51
End: 2021-03-04

## 2021-03-04 DIAGNOSIS — G56.01 CARPAL TUNNEL SYNDROME OF RIGHT WRIST: Primary | ICD-10-CM

## 2021-03-04 DIAGNOSIS — G56.11 MEDIAN NEUROPATHY, RIGHT: ICD-10-CM

## 2021-03-04 RX ORDER — SODIUM CHLORIDE 0.9 % (FLUSH) 0.9 %
10 SYRINGE (ML) INJECTION EVERY 12 HOURS SCHEDULED
Status: CANCELLED | OUTPATIENT
Start: 2021-03-04

## 2021-03-04 RX ORDER — SODIUM CHLORIDE 0.9 % (FLUSH) 0.9 %
10 SYRINGE (ML) INJECTION PRN
Status: CANCELLED | OUTPATIENT
Start: 2021-03-04

## 2021-03-04 NOTE — H&P (VIEW-ONLY)
Orthopaedic H&P Note     Estelle Aburto is a 52 y.o. female, her YOB: 1970 with the following history as recorded in [facility]:             Patient Active Problem List     Diagnosis Date Noted    Carpal tunnel syndrome of right wrist 03/03/2021    Closed fracture of shaft of bone of right forearm      Forearm fractures, both bones, closed, right, initial encounter 10/16/2020      Current Facility-Administered Medications          Current Outpatient Medications   Medication Sig Dispense Refill    acetaminophen (TYLENOL) 500 MG tablet Take 500 mg by mouth every 6 hours as needed for Pain        buprenorphine-naloxone (SUBOXONE) 8-2 MG FILM SL film Place 1 Film under the tongue 2 times daily.        ibuprofen (ADVIL;MOTRIN) 800 MG tablet Take 800 mg by mouth every 6 hours as needed for Pain        Temazepam (RESTORIL PO) Take by mouth as needed          No current facility-administered medications for this visit.          Allergies: Patient has no known allergies. Past Medical History        Past Medical History:   Diagnosis Date    Arthritis      Bulging lumbar disc      Restless leg syndrome      Stress fracture           Past Surgical History         Past Surgical History:   Procedure Laterality Date    ARM SURGERY Right 10/17/2020     FOREARM OPEN REDUCTION INTERNAL FIXATION performed by Orlando Mark MD at [address]        TONSILLECTOMY        TUBAL LIGATION             Family History   History reviewed. No pertinent family history. Social History            Tobacco Use    Smoking status: Current Every Day Smoker       Packs/day: 1.00    Smokeless tobacco: Never Used    Tobacco comment: patient vapes   Substance Use Topics    Alcohol use:  No                                    Chief Complaint   Patient presents with    Follow Up After Procedure       Right forearm ORIF DOS 10-         SUBJECTIVE: Ramses Adams Mireya Frankel is an 52 y.o. female who is RHD who is now 5 months from R BB forearm fracture ORIF who presents today for follow up from EMG/NCT for her R CTS symptoms. Patient states her symptoms have worsened with her attempts to continue with her normal ADLs. Patient feels that if she does not use her night splints pain, N/T worsens. She continues to have clumsiness with the right hand. Patient has had some increased sensitivity over the ulnar plate nearest her elbow. Denies any new fall or injury. Denies any other orthopedic complaints.      Review of Systems   Constitutional: Negative for fever, chills, diaphoresis, appetite change and fatigue. HENT: Negative for dental issues, hearing loss and tinnitus. Negative for congestion, sinus pressure, sneezing, sore throat. Negative for headache. Eyes: Negative for visual disturbance, blurred and double vision. Negative for pain, discharge, redness and itching  Respiratory: Negative for cough, shortness of breath and wheezing. Cardiovascular: Negative for chest pain, palpitations and leg swelling. No dyspnea on exertion   Gastrointestinal:   Negative for nausea, vomiting, abdominal pain, diarrhea, constipation  or black or bloody. Hematologic\Lymphatic:  negative for bleeding, petechiae,   Genitourinary: Negative for hematuria and difficulty urinating. Musculoskeletal: Negative for neck pain and stiffness. Negative for back pain, see HPI  Skin: Negative for pallor, rash and wound. Neurological: Negative for dizziness, tremors, seizures, weakness, light-headedness, no TIA or stroke symptoms. No numbness and headaches. Psychiatric/Behavioral: Negative.         OBJECTIVE:       Physical Examination:   General appearance: alert, well appearing, and in no distress,  normal appearing weight.  No visible signs of trauma   Mental status: alert, oriented to person, place, and time, normal mood, behavior, speech, dress, motor activity, and thought processes  Abdomen: soft, nondistended  Resp:   resp easy and unlabored, no audible wheezes note, normal symmetrical expansion of both hemithoraces  Cardiac: distal pulses palpable, skin and extremities well perfused  Neurological: alert, oriented X3, normal speech, no focal findings or movement disorder noted, motor and sensory grossly normal bilaterally, normal muscle tone, no tremors, strength 5/5, normal gait and station  HEENT: normochephalic atraumatic, external ears and eyes normal, sclera normal, neck supple, no nasal discharge. Extremities:   peripheral pulses normal, no edema, redness or tenderness in the calves   Skin: normal coloration, no rashes or open wounds, no suspicious skin lesions noted  Psych: Affect euthymic   Musculoskeletal:   Extremity:  Right Upper Extremity  Skin is clean dry and intact no new rashes, lesions or evidence of skin breakdown  no edema noted  2+ Radial pulse, fingers warm with BCR  Flex/extension intact to wrist, thumb and fingers   Finger opposition intact  Finger adduction/abduction intact  Patient does have some weakness with little digit adduction, patient is able to complete but increased effort to complete  Finger crossover intact  able to make concentric fist  Patient able to perform full supination and pronation  Subjectively states sensation intact to Ulnar Nerve and Radial Nerve distribution. Significant paraesthesias to the median nerve distribution. No thenar atrophy noted when compared to contralateral side   +Tinels   + Durkins  Incision(s) well healed, no signs of infection, hypertrophic or keloid changes. She is using silicon scar strips to the forearm incisions  Patient has TTP to the forearm at the most proximal aspect of her ulnar plate.      Temp 63.5 °F (37.2 °C) (Oral)   LMP 06/10/2013      XR: 3/3/21 AP/Lateral views of the right radius/ulna demonstrates plate and screw fixation to the radius and ulna shafts. There is no evidence of hardware failure or loosening.  Radial shaft fracture appears well healed. There is persistent visualization of ulnar shaft fracture. No other acute osseous abnormality      Reviewed prior testing:  EMG/NCV: Date: 2/16/21   Diagnostic Interpretation:   Mild to moderate right carpal tunnel syndrome with moderate reduction in median compound muscle potential amplitude; possible contribution of right forearm surgical plate.  Clinical correlation advised.     ASSESSMENT:       Diagnosis Orders   1. Closed fracture of shaft of bone of right forearm with routine healing, subsequent encounter      2. Screening for viral disease  COVID-19 Ambulatory   3. Carpal tunnel syndrome of right wrist            Discussion: Had lengthy discussion with patient regarding Her diagnosis, typical prognosis, and expected outcomes. I reviewed the possible complications from the injury itself despite treatment choosen. I also discussed treatment options including nonoperative managements versus surgical management, along with risks and benefits of each. They have elected for surgical management at this time. We discussed for CTR at this time, would like to continue to monitor fracture healing prior to pursuing removal of hardware.     Risk, benefits and treatment options discussed with [facility]. She has verbalized understanding of options. The possibility of complications were also discussed to include but not limited to nerve damage, infection, problems with wound healing, vascular injury, chronic pain, stiffness, dysfunction, nonhealing of the bone, symptomatic hardware and/or its failure, need for subsequent surgery, dislocation, and blood clots as well as medical related problems and other problems not specifically discussed. Risk of anesthesia also discussed to include death.    Post-op care, work, activity and restrictions which included the use of pain medication and possibility of using blood thinner post op were also discussed with [facility] and she verbalized and agreed with the restrictions.             PLAN:  1. Your surgery is scheduled for Right Carpal Tunnel Release at 3/11/2021 at 10:00 AM  with Dr. Haroon Knight MD at the [facility] on [facility] in [facility] . You will need to report to Preop area  that morning at 8:30 AM    2. You are having Outpatient surgery so you will be returning home the same day  3. Preadmission Testing (PAT) department at [facility] will contact you with all the details prior to surgery. 4. Nothing to eat or drink after midnight the night before surgery. You may take a pain pill and any other medicine PAT instructs you to take with small sip of water if needed. 5. COVID 19 testing pre op  6. Continue with ice and elevation to reduce swelling  7. Activity as tolerated with right upper extremity  8. Take pain medicine as instructed  9. Call office with any question or concerns: [phone]. Hold ASA the day of surgery.  Hold all NSAIDs 3 days prior to surgery

## 2021-03-04 NOTE — H&P
Orthopaedic H&P Note     Vikki Bergman is a 52 y.o. female, her YOB: 1970 with the following history as recorded in [facility]:             Patient Active Problem List     Diagnosis Date Noted    Carpal tunnel syndrome of right wrist 03/03/2021    Closed fracture of shaft of bone of right forearm      Forearm fractures, both bones, closed, right, initial encounter 10/16/2020      Current Facility-Administered Medications          Current Outpatient Medications   Medication Sig Dispense Refill    acetaminophen (TYLENOL) 500 MG tablet Take 500 mg by mouth every 6 hours as needed for Pain        buprenorphine-naloxone (SUBOXONE) 8-2 MG FILM SL film Place 1 Film under the tongue 2 times daily.        ibuprofen (ADVIL;MOTRIN) 800 MG tablet Take 800 mg by mouth every 6 hours as needed for Pain        Temazepam (RESTORIL PO) Take by mouth as needed          No current facility-administered medications for this visit.          Allergies: Patient has no known allergies. Past Medical History        Past Medical History:   Diagnosis Date    Arthritis      Bulging lumbar disc      Restless leg syndrome      Stress fracture           Past Surgical History         Past Surgical History:   Procedure Laterality Date    ARM SURGERY Right 10/17/2020     FOREARM OPEN REDUCTION INTERNAL FIXATION performed by Christina Guerrero MD at [address]        TONSILLECTOMY        TUBAL LIGATION             Family History   History reviewed. No pertinent family history. Social History            Tobacco Use    Smoking status: Current Every Day Smoker       Packs/day: 1.00    Smokeless tobacco: Never Used    Tobacco comment: patient vapes   Substance Use Topics    Alcohol use:  No                                    Chief Complaint   Patient presents with    Follow Up After Procedure       Right forearm ORIF DOS 10-         SUBJECTIVE: Cherri Salgado nondistended  Resp:   resp easy and unlabored, no audible wheezes note, normal symmetrical expansion of both hemithoraces  Cardiac: distal pulses palpable, skin and extremities well perfused  Neurological: alert, oriented X3, normal speech, no focal findings or movement disorder noted, motor and sensory grossly normal bilaterally, normal muscle tone, no tremors, strength 5/5, normal gait and station  HEENT: normochephalic atraumatic, external ears and eyes normal, sclera normal, neck supple, no nasal discharge. Extremities:   peripheral pulses normal, no edema, redness or tenderness in the calves   Skin: normal coloration, no rashes or open wounds, no suspicious skin lesions noted  Psych: Affect euthymic   Musculoskeletal:   Extremity:  Right Upper Extremity  Skin is clean dry and intact no new rashes, lesions or evidence of skin breakdown  no edema noted  2+ Radial pulse, fingers warm with BCR  Flex/extension intact to wrist, thumb and fingers   Finger opposition intact  Finger adduction/abduction intact  Patient does have some weakness with little digit adduction, patient is able to complete but increased effort to complete  Finger crossover intact  able to make concentric fist  Patient able to perform full supination and pronation  Subjectively states sensation intact to Ulnar Nerve and Radial Nerve distribution. Significant paraesthesias to the median nerve distribution. No thenar atrophy noted when compared to contralateral side   +Tinels   + Durkins  Incision(s) well healed, no signs of infection, hypertrophic or keloid changes. She is using silicon scar strips to the forearm incisions  Patient has TTP to the forearm at the most proximal aspect of her ulnar plate.      Temp 19.8 °F (37.2 °C) (Oral)   LMP 06/10/2013      XR: 3/3/21 AP/Lateral views of the right radius/ulna demonstrates plate and screw fixation to the radius and ulna shafts. There is no evidence of hardware failure or loosening.  Radial shaft fracture appears well healed. There is persistent visualization of ulnar shaft fracture. No other acute osseous abnormality      Reviewed prior testing:  EMG/NCV: Date: 2/16/21   Diagnostic Interpretation:   Mild to moderate right carpal tunnel syndrome with moderate reduction in median compound muscle potential amplitude; possible contribution of right forearm surgical plate.  Clinical correlation advised.     ASSESSMENT:       Diagnosis Orders   1. Closed fracture of shaft of bone of right forearm with routine healing, subsequent encounter      2. Screening for viral disease  COVID-19 Ambulatory   3. Carpal tunnel syndrome of right wrist            Discussion: Had lengthy discussion with patient regarding Her diagnosis, typical prognosis, and expected outcomes. I reviewed the possible complications from the injury itself despite treatment choosen. I also discussed treatment options including nonoperative managements versus surgical management, along with risks and benefits of each. They have elected for surgical management at this time. We discussed for CTR at this time, would like to continue to monitor fracture healing prior to pursuing removal of hardware.     Risk, benefits and treatment options discussed with [facility]. She has verbalized understanding of options. The possibility of complications were also discussed to include but not limited to nerve damage, infection, problems with wound healing, vascular injury, chronic pain, stiffness, dysfunction, nonhealing of the bone, symptomatic hardware and/or its failure, need for subsequent surgery, dislocation, and blood clots as well as medical related problems and other problems not specifically discussed. Risk of anesthesia also discussed to include death.    Post-op care, work, activity and restrictions which included the use of pain medication and possibility of using blood thinner post op were also discussed with [facility] and she verbalized and agreed with the restrictions.             PLAN:  1. Your surgery is scheduled for Right Carpal Tunnel Release at 3/11/2021 at 10:00 AM  with Dr. Cherre Dancer, MD at the [facility] in [facility] . You will need to report to Preop area  that morning at 8:30 AM    2. You are having Outpatient surgery so you will be returning home the same day  3. Preadmission Testing (PAT) department at [address] [place] [address] will contact you with all the details prior to surgery. 4. Nothing to eat or drink after midnight the night before surgery. You may take a pain pill and any other medicine PAT instructs you to take with small sip of water if needed. 5. COVID 19 testing pre op  6. Continue with ice and elevation to reduce swelling  7. Activity as tolerated with right upper extremity  8. Take pain medicine as instructed  9. Call office with any question or concerns: [phone]. Hold ASA the day of surgery.  Hold all NSAIDs 3 days prior to surgery

## 2021-03-04 NOTE — PROGRESS NOTES
PATIENT AGREES TO COVID TEST @ [facility] 3/5/21. AGREES TO SELF ISOLATE UNTIL SURGERY DATE.

## 2021-03-05 DIAGNOSIS — Z11.59 SCREENING FOR VIRAL DISEASE: ICD-10-CM

## 2021-03-05 RX ORDER — MULTIVIT WITH MINERALS/LUTEIN
500 TABLET ORAL DAILY
COMMUNITY

## 2021-03-05 RX ORDER — M-VIT,TX,IRON,MINS/CALC/FOLIC 27MG-0.4MG
1 TABLET ORAL DAILY
COMMUNITY

## 2021-03-05 NOTE — PROGRESS NOTES
[facility] PRE-ADMISSION TESTING GENERAL INSTRUCTIONS- [facility]-phone number:[phone]    GENERAL INSTRUCTIONS  [x] Antibacterial Soap shower Night before and/or AM of Surgery  [] Terry wipe instruction sheet and wipes given. [x] Nothing by mouth after midnight, including gum, candy, mints, or water. [x] You may brush your teeth, gargle, but do NOT swallow water. []Hibiclens shower  the night before and the morning of surgery. Do not use             Hibiclens on your face or head. [x]No smoking, chewing tobacco, illegal drugs, or alcohol within 24 hours of your surgery. [x] Jewelry, valuables or body piercing's should not be brought to the hospital. All body and/or tongue piercing's must be removed prior to arriving to hospital.  ALL hair pins must be removed. [x] Do not wear makeup, lotions, powders, deodorant. Nail polish as directed by the nurse. [x] Arrange transportation with a responsible adult  to and from the hospital. If you do not have a responsible adult  to transport you, you will need to make arrangements with a medical transportation company (i.e. [employer]. A Uber/taxi/bus is not appropriate unless you are accompanied by a responsible adult ). Arrange for someone to be with you for the remainder of the day and for 24 hours after your procedure due to having had anesthesia. Who will be your  for transportation? Son    Who will be staying with you for 24 hrs after your procedure? son  [x] Bring insurance card and photo ID.  [] Transfusion Bracelet: Please bring with you to hospital, day of surgery  [] Bring urine specimen day of surgery. Any small container is acceptable. [] Use inhalers the morning of surgery and bring with you to hospital.  [] Bring copy of living will or healthcare power of  papers to be placed in your electronic record.   [] CPAP/BI-PAP: Please bring your machine if you are to spend the night in the hospital.     PARKING INSTRUCTIONS:   [x] Arrival Time: 0930, Main  Entrance, wear mask  · [] Parking lot '\"I\"  is located on [facility] (the corner of [facility] and [facility]). To enter, press the button and the gate will lift. A free token will be provided to exit the lot. One car per patient is allowed to park in this lot. All other cars are to park on [address] [place] [address] Street either in the parking garage or the handicap lot. [x] To reach the [facility] lobby from [address] [place] [address], upon entering the hospital, take elevator B to the 3rd floor. EDUCATION INSTRUCTIONS:      [] Knee or hip replacement booklet & exercise pamphlets given. [] Eduarda Byrd placed in chart. [] Pre-admission Testing educational folder given  [] Incentive Spirometry,coughing & deep breathing exercises reviewed. []Medication information sheet(s)   []Fluoroscopy-Xray used in surgery reviewed with patient. Educational pamphlet placed in chart. [x]Pain: Post-op pain is normal and to be expected. You will be asked to rate your pain from 0-10(a zero is not acceptable-education is needed). Your post-op pain goal is:5  [x] Ask your nurse for your pain medication. [] Joint camp offered. [] Joint replacement booklets given. [] Other:___________________________    MEDICATION INSTRUCTIONS:   [x]Bring a complete list of your medications, please write the last time you took the medicine, give this list to the nurse. [x] Take the following medications the morning of surgery with 1-2 ounces of water: see list   [x] Stop herbal supplements and vitamins 5 days before your surgery. [] DO NOT take any diabetic medicine the morning of surgery. Follow instructions for insulin the day before surgery. [] If you are diabetic and your blood sugar is low or you feel symptomatic, you may drink 1-2 ounces of apple juice or take a glucose tablet.   The morning of your procedure, you may call the pre-op area if you have concerns about your blood sugar [phone]. [] Use your inhalers the morning of surgery. Bring your emergency inhaler with you day of surgery. [] Follow physician instructions regarding any blood thinners you may be taking. WHAT TO EXPECT:  [x] The day of surgery you will be greeted and checked in by the [employer].  In addition, you will be registered in the [facility] by a Patient Access Representative. Please bring your photo ID and insurance card. A nurse will greet you in accordance to the time you are needed in the pre-op area to prepare you for surgery. Please do not be discouraged if you are not greeted in the order you arrive as there are many variables that are involved in patient preparation. Your patience is greatly appreciated as you wait for your nurse. Please bring in items such as: books, magazines, newspapers, electronics, or any other items  to occupy your time in the waiting area. [x]  Delays may occur with surgery and staff will make a sincere effort to keep you informed of delays. If any delays occur with your procedure, we apologize ahead of time for your inconvenience as we recognize the value of your time.

## 2021-03-07 LAB
SARS-COV-2: NOT DETECTED
SOURCE: NORMAL

## 2021-03-11 ENCOUNTER — ANESTHESIA EVENT (OUTPATIENT)
Dept: OPERATING ROOM | Age: 51
End: 2021-03-11
Payer: COMMERCIAL

## 2021-03-11 ENCOUNTER — HOSPITAL ENCOUNTER (OUTPATIENT)
Age: 51
Setting detail: OUTPATIENT SURGERY
Discharge: HOME | End: 2021-03-11
Attending: ORTHOPAEDIC SURGERY | Admitting: ORTHOPAEDIC SURGERY
Payer: COMMERCIAL

## 2021-03-11 ENCOUNTER — ANESTHESIA (OUTPATIENT)
Dept: OPERATING ROOM | Age: 51
End: 2021-03-11
Payer: COMMERCIAL

## 2021-03-11 VITALS
HEIGHT: 65 IN | TEMPERATURE: 97 F | BODY MASS INDEX: 25.83 KG/M2 | RESPIRATION RATE: 16 BRPM | HEART RATE: 78 BPM | SYSTOLIC BLOOD PRESSURE: 117 MMHG | WEIGHT: 155 LBS | DIASTOLIC BLOOD PRESSURE: 82 MMHG | OXYGEN SATURATION: 97 %

## 2021-03-11 VITALS — SYSTOLIC BLOOD PRESSURE: 94 MMHG | OXYGEN SATURATION: 97 % | DIASTOLIC BLOOD PRESSURE: 58 MMHG

## 2021-03-11 DIAGNOSIS — U07.1 COVID-19: Primary | ICD-10-CM

## 2021-03-11 DIAGNOSIS — S52.91XA CLOSED FRACTURE OF SHAFT OF BONE OF RIGHT FOREARM, INITIAL ENCOUNTER: ICD-10-CM

## 2021-03-11 LAB
BASOPHILS ABSOLUTE: 0.02 E9/L (ref 0–0.2)
BASOPHILS RELATIVE PERCENT: 0.3 % (ref 0–2)
EOSINOPHILS ABSOLUTE: 0.34 E9/L (ref 0.05–0.5)
EOSINOPHILS RELATIVE PERCENT: 4.6 % (ref 0–6)
HCT VFR BLD CALC: 40.9 % (ref 34–48)
HEMOGLOBIN: 13 G/DL (ref 11.5–15.5)
IMMATURE GRANULOCYTES #: 0.02 E9/L
IMMATURE GRANULOCYTES %: 0.3 % (ref 0–5)
LYMPHOCYTES ABSOLUTE: 2.27 E9/L (ref 1.5–4)
LYMPHOCYTES RELATIVE PERCENT: 30.7 % (ref 20–42)
MCH RBC QN AUTO: 26 PG (ref 26–35)
MCHC RBC AUTO-ENTMCNC: 31.8 % (ref 32–34.5)
MCV RBC AUTO: 81.8 FL (ref 80–99.9)
MONOCYTES ABSOLUTE: 0.53 E9/L (ref 0.1–0.95)
MONOCYTES RELATIVE PERCENT: 7.2 % (ref 2–12)
NEUTROPHILS ABSOLUTE: 4.22 E9/L (ref 1.8–7.3)
NEUTROPHILS RELATIVE PERCENT: 56.9 % (ref 43–80)
PDW BLD-RTO: 14.6 FL (ref 11.5–15)
PLATELET # BLD: 267 E9/L (ref 130–450)
PMV BLD AUTO: 10 FL (ref 7–12)
RBC # BLD: 5 E12/L (ref 3.5–5.5)
WBC # BLD: 7.4 E9/L (ref 4.5–11.5)

## 2021-03-11 PROCEDURE — 2580000003 HC RX 258: Performed by: NURSE ANESTHETIST, CERTIFIED REGISTERED

## 2021-03-11 PROCEDURE — 36415 COLL VENOUS BLD VENIPUNCTURE: CPT

## 2021-03-11 PROCEDURE — 3700000001 HC ADD 15 MINUTES (ANESTHESIA): Performed by: ORTHOPAEDIC SURGERY

## 2021-03-11 PROCEDURE — 6360000002 HC RX W HCPCS: Performed by: NURSE ANESTHETIST, CERTIFIED REGISTERED

## 2021-03-11 PROCEDURE — 6360000002 HC RX W HCPCS: Performed by: PHYSICIAN ASSISTANT

## 2021-03-11 PROCEDURE — 2500000003 HC RX 250 WO HCPCS: Performed by: ORTHOPAEDIC SURGERY

## 2021-03-11 PROCEDURE — 3600000002 HC SURGERY LEVEL 2 BASE: Performed by: ORTHOPAEDIC SURGERY

## 2021-03-11 PROCEDURE — 3700000000 HC ANESTHESIA ATTENDED CARE: Performed by: ORTHOPAEDIC SURGERY

## 2021-03-11 PROCEDURE — 85025 COMPLETE CBC W/AUTO DIFF WBC: CPT

## 2021-03-11 PROCEDURE — 3600000012 HC SURGERY LEVEL 2 ADDTL 15MIN: Performed by: ORTHOPAEDIC SURGERY

## 2021-03-11 PROCEDURE — 2709999900 HC NON-CHARGEABLE SUPPLY: Performed by: ORTHOPAEDIC SURGERY

## 2021-03-11 PROCEDURE — 64721 CARPAL TUNNEL SURGERY: CPT | Performed by: ORTHOPAEDIC SURGERY

## 2021-03-11 PROCEDURE — 7100000010 HC PHASE II RECOVERY - FIRST 15 MIN: Performed by: ORTHOPAEDIC SURGERY

## 2021-03-11 PROCEDURE — 7100000011 HC PHASE II RECOVERY - ADDTL 15 MIN: Performed by: ORTHOPAEDIC SURGERY

## 2021-03-11 RX ORDER — BUPIVACAINE HYDROCHLORIDE 2.5 MG/ML
INJECTION, SOLUTION EPIDURAL; INFILTRATION; INTRACAUDAL PRN
Status: DISCONTINUED | OUTPATIENT
Start: 2021-03-11 | End: 2021-03-11 | Stop reason: ALTCHOICE

## 2021-03-11 RX ORDER — LIDOCAINE HYDROCHLORIDE 20 MG/ML
INJECTION, SOLUTION INTRAVENOUS PRN
Status: DISCONTINUED | OUTPATIENT
Start: 2021-03-11 | End: 2021-03-11 | Stop reason: SDUPTHER

## 2021-03-11 RX ORDER — LIDOCAINE HYDROCHLORIDE 10 MG/ML
INJECTION, SOLUTION EPIDURAL; INFILTRATION; INTRACAUDAL; PERINEURAL PRN
Status: DISCONTINUED | OUTPATIENT
Start: 2021-03-11 | End: 2021-03-11 | Stop reason: ALTCHOICE

## 2021-03-11 RX ORDER — PROPOFOL 10 MG/ML
INJECTION, EMULSION INTRAVENOUS CONTINUOUS PRN
Status: DISCONTINUED | OUTPATIENT
Start: 2021-03-11 | End: 2021-03-11 | Stop reason: SDUPTHER

## 2021-03-11 RX ORDER — FENTANYL CITRATE 50 UG/ML
INJECTION, SOLUTION INTRAMUSCULAR; INTRAVENOUS PRN
Status: DISCONTINUED | OUTPATIENT
Start: 2021-03-11 | End: 2021-03-11 | Stop reason: SDUPTHER

## 2021-03-11 RX ORDER — OXYCODONE HYDROCHLORIDE AND ACETAMINOPHEN 5; 325 MG/1; MG/1
1 TABLET ORAL EVERY 6 HOURS PRN
Qty: 20 TABLET | Refills: 0 | Status: SHIPPED | OUTPATIENT
Start: 2021-03-11 | End: 2021-03-16

## 2021-03-11 RX ORDER — MIDAZOLAM HYDROCHLORIDE 1 MG/ML
INJECTION INTRAMUSCULAR; INTRAVENOUS PRN
Status: DISCONTINUED | OUTPATIENT
Start: 2021-03-11 | End: 2021-03-11 | Stop reason: SDUPTHER

## 2021-03-11 RX ORDER — SODIUM CHLORIDE 0.9 % (FLUSH) 0.9 %
10 SYRINGE (ML) INJECTION EVERY 12 HOURS SCHEDULED
Status: DISCONTINUED | OUTPATIENT
Start: 2021-03-11 | End: 2021-03-11 | Stop reason: HOSPADM

## 2021-03-11 RX ORDER — SODIUM CHLORIDE 0.9 % (FLUSH) 0.9 %
10 SYRINGE (ML) INJECTION PRN
Status: DISCONTINUED | OUTPATIENT
Start: 2021-03-11 | End: 2021-03-11 | Stop reason: HOSPADM

## 2021-03-11 RX ORDER — SODIUM CHLORIDE 9 MG/ML
INJECTION, SOLUTION INTRAVENOUS CONTINUOUS PRN
Status: DISCONTINUED | OUTPATIENT
Start: 2021-03-11 | End: 2021-03-11 | Stop reason: SDUPTHER

## 2021-03-11 RX ADMIN — FENTANYL CITRATE 25 MCG: 50 INJECTION, SOLUTION INTRAMUSCULAR; INTRAVENOUS at 11:16

## 2021-03-11 RX ADMIN — PROPOFOL 75 MCG/KG/MIN: 10 INJECTION, EMULSION INTRAVENOUS at 11:01

## 2021-03-11 RX ADMIN — PROPOFOL 100 MCG/KG/MIN: 10 INJECTION, EMULSION INTRAVENOUS at 10:56

## 2021-03-11 RX ADMIN — MIDAZOLAM 2 MG: 1 INJECTION INTRAMUSCULAR; INTRAVENOUS at 10:52

## 2021-03-11 RX ADMIN — FENTANYL CITRATE 50 MCG: 50 INJECTION, SOLUTION INTRAMUSCULAR; INTRAVENOUS at 10:56

## 2021-03-11 RX ADMIN — SODIUM CHLORIDE: 9 INJECTION, SOLUTION INTRAVENOUS at 10:36

## 2021-03-11 RX ADMIN — FENTANYL CITRATE 25 MCG: 50 INJECTION, SOLUTION INTRAMUSCULAR; INTRAVENOUS at 11:07

## 2021-03-11 RX ADMIN — Medication 2 G: at 11:01

## 2021-03-11 RX ADMIN — LIDOCAINE HYDROCHLORIDE 50 MG: 20 INJECTION, SOLUTION INTRAVENOUS at 10:56

## 2021-03-11 ASSESSMENT — PULMONARY FUNCTION TESTS
PIF_VALUE: 0

## 2021-03-11 ASSESSMENT — PAIN - FUNCTIONAL ASSESSMENT: PAIN_FUNCTIONAL_ASSESSMENT: 0-10

## 2021-03-11 ASSESSMENT — PAIN SCALES - GENERAL: PAINLEVEL_OUTOF10: 0

## 2021-03-11 ASSESSMENT — LIFESTYLE VARIABLES: SMOKING_STATUS: 1

## 2021-03-11 NOTE — ANESTHESIA PRE PROCEDURE
Department of Anesthesiology  Preprocedure Note       Name:  Tanya Peguero   Age:  52 y.o.  :  1970                                          MRN:  [id number]         Date:  3/11/2021      Surgeon: Divine Blue):  Tung Camacho MD    Procedure: Procedure(s):  RIGHT CARPAL TUNNEL RELEASE    Medications prior to admission:   Prior to Admission medications    Medication Sig Start Date End Date Taking? Authorizing Provider   Multiple Vitamins-Minerals (THERAPEUTIC MULTIVITAMIN-MINERALS) tablet Take 1 tablet by mouth daily   Yes Historical Provider, MD   Ascorbic Acid (VITAMIN C) 250 MG tablet Take 500 mg by mouth daily   Yes Historical Provider, MD   buprenorphine-naloxone (SUBOXONE) 8-2 MG FILM SL film Place under the tongue 2 times daily. Yes Historical Provider, MD   acetaminophen (TYLENOL) 500 MG tablet Take 500 mg by mouth every 6 hours as needed for Pain    Historical Provider, MD   ibuprofen (ADVIL;MOTRIN) 800 MG tablet Take 800 mg by mouth every 6 hours as needed for Pain    Historical Provider, MD   Temazepam (RESTORIL PO) Take by mouth as needed    Historical Provider, MD       Current medications:    Current Facility-Administered Medications   Medication Dose Route Frequency Provider Last Rate Last Admin    ceFAZolin (ANCEF) 2000 mg in sterile water 20 mL IV syringe  2,000 mg Intravenous On Call to [address] Avinash Rogers PA-C        sodium chloride flush 0.9 % injection 10 mL  10 mL Intravenous 2 times per day Nicole Osullivan PA-C        sodium chloride flush 0.9 % injection 10 mL  10 mL Intravenous PRN Nicole Osullivan PA-C           Allergies:  No Known Allergies    Problem List:    Patient Active Problem List   Diagnosis Code    Forearm fractures, both bones, closed, right, initial encounter S52.91XA, S52.201A    Closed fracture of shaft of bone of right forearm S52. 91XA    Carpal tunnel syndrome of right wrist G56.01       Past Medical History:        Diagnosis Date    Arthritis     Bulging lumbar disc     Restless leg syndrome     Stress fracture        Past Surgical History:        Procedure Laterality Date    ARM SURGERY Right 10/17/2020    FOREARM OPEN REDUCTION INTERNAL FIXATION performed by Roslyn Caballero MD at [address] [place] [address]      TONSILLECTOMY      TUBAL LIGATION         Social History:    Social History     Tobacco Use    Smoking status: Current Every Day Smoker     Packs/day: 1.00    Smokeless tobacco: Never Used    Tobacco comment: patient vapes   Substance Use Topics    Alcohol use: No                                Ready to quit: Not Answered  Counseling given: Not Answered  Comment: patient vapes      Vital Signs (Current):   Vitals:    03/11/21 1006   BP: 108/67   Pulse: 57   Resp: 20   Temp: 36.4 °C (97.5 °F)   TempSrc: Temporal   SpO2: 96%   Weight: 155 lb (70.3 kg)   Height: 5' 5\" (1.651 m)                                              BP Readings from Last 3 Encounters:   03/11/21 108/67   12/01/20 114/75   11/02/20 (!) 145/77       NPO Status: Time of last liquid consumption: 2300                        Time of last solid consumption: 2300                        Date of last liquid consumption: 03/10/21                        Date of last solid food consumption: 03/10/21    BMI:   Wt Readings from Last 3 Encounters:   03/11/21 155 lb (70.3 kg)   02/16/21 150 lb (68 kg)   10/17/20 145 lb (65.8 kg)     Body mass index is 25.79 kg/m².     CBC:   Lab Results   Component Value Date    WBC 7.4 03/11/2021    RBC 5.00 03/11/2021    HGB 13.0 03/11/2021    HCT 40.9 03/11/2021    MCV 81.8 03/11/2021    RDW 14.6 03/11/2021     03/11/2021       CMP:   Lab Results   Component Value Date     10/17/2020    K 3.5 10/17/2020     10/17/2020    CO2 20 10/17/2020    BUN 12 10/17/2020    CREATININE 0.6 10/17/2020    GFRAA >60 10/17/2020    LABGLOM >60 10/17/2020    GLUCOSE 102 10/17/2020    PROT 7.0 10/17/2020    CALCIUM 8.3 10/17/2020    BILITOT <0.2 10/17/2020    ALKPHOS 32 10/17/2020    AST 19 10/17/2020    ALT 12 10/17/2020       POC Tests: No results for input(s): POCGLU, POCNA, POCK, POCCL, POCBUN, POCHEMO, POCHCT in the last 72 hours. Coags:   Lab Results   Component Value Date    PROTIME 11.8 10/17/2020    INR 1.1 10/17/2020    APTT 29.4 10/17/2020       HCG (If Applicable):   Lab Results   Component Value Date    PREGTESTUR neg 03/13/2016        ABGs: No results found for: PHART, PO2ART, CUF0XLN, UYF3AVH, BEART, K6HDTOEQ     Type & Screen (If Applicable):  No results found for: LABABO, LABRH    Drug/Infectious Status (If Applicable):  No results found for: HIV, HEPCAB    COVID-19 Screening (If Applicable):   Lab Results   Component Value Date    COVID19 Not Detected 03/05/2021         Anesthesia Evaluation  Patient summary reviewed and Nursing notes reviewed  Airway: Mallampati: II  TM distance: >3 FB   Neck ROM: full  Mouth opening: > = 3 FB Dental: normal exam         Pulmonary: breath sounds clear to auscultation  (+) current smoker          Patient smoked on day of surgery. Cardiovascular:Negative CV ROS            Rhythm: regular                      Neuro/Psych:   (+) neuromuscular disease:,              ROS comment: Carpal tunnel GI/Hepatic/Renal: Neg GI/Hepatic/Renal ROS            Endo/Other: Negative Endo/Other ROS                     ROS comment: S/P WALLY Abdominal:           Vascular:                                        Anesthesia Plan      MAC     ASA 3       Induction: intravenous. Anesthetic plan and risks discussed with patient and spouse. Plan discussed with attending.                   Isabelle Melendrez, SENIA - CRNA   3/11/2021

## 2021-03-11 NOTE — PROGRESS NOTES
COVID testing completed on: 3/5/21  Results of the test: negative  The patient verbally confirms that they did adhere to the self-quarantine guidelines. No signs or symptoms expressed or observed.

## 2021-03-11 NOTE — OP NOTE
Operative Note      Patient: Bryce Leventhal  YOB: 1970  MRN: [id number]    Date of Procedure: 3/11/2021    Pre-Op Diagnosis: RIGHT CARPAL TUNNEL SYNDROME    Post-Op Diagnosis: Same       Procedure(s):  RIGHT CARPAL TUNNEL RELEASE    Surgeon(s):  Radha Hay MD    Assistant:   Resident: Jhon Tamez DO; Yohannes Kirkpatrick DO    Anesthesia: Monitor Anesthesia Care    Estimated Blood Loss (mL): Minimal    Complications: None    Specimens:   * No specimens in log *    Implants:  * No implants in log *      Drains: * No LDAs found *    Findings: compression released. Detailed Description of Procedure:   Patient was brought to the operating room in a supine position on hospital room bed. Patient was transferred to the operating room table by multiple individuals in a safe fashion with anesthesia and control the patient C-spine and airway. Once operating room table all points pressure identified well-padded. A tourniquet was applied to the right upper arm. Her right upper extremity was sterilely prepped and draped in standard orthopedic fashion. A timeout was performed indicating the appropriate identification of the patient, the procedure to be performed, and the side to be performed upon. We are then able to nima out in appropriate incision by flexing the fourth digit. We marked Martinez's cardinal line distally. We were then able to place an Esmarch and elevate the tourniquet to 250 mmHg. A 15 blade used to make an incision. Dissection was carried through the subcutaneous fat down to the palmar fascia. Fascia was then divided. The transverse carpal ligament was then identified. We then dissected layer by layer and to a breech the transverse carpal ligament. Franchot Gitelman was then used to protect the contents of the carpal tunnel. We are then able to release with both sharp dissection scissors. We then obtained an appropriate release distally to resolve the palmar fat.   Proximally we released past the wrist crease. We checked the viability of the structures which would not demonstrate the release. It was then sen irrigated with sterile normal saline. The tourniquet is let down 0 mmHg all bleeding was controlled with pressure. We were then able to close the wound with 3-0 nylon sutures. Patient had a sterile dressing and a well-padded soft dressing put in position. She was taken to the PACU in stable condition.       Postoperative plan:  No lifting pushing pulling right upper extremity    Follow-up in office in 2 weeks for suture removal    May work on gentle range of motion with right hand    Electronically signed by Fernando Mancilla MD on 3/11/2021 at 11:31 AM

## 2021-03-11 NOTE — ANESTHESIA POSTPROCEDURE EVALUATION
Department of Anesthesiology  Postprocedure Note    Patient: Santana Jason  MRN: [id number]  YOB: 1970  Date of evaluation: 3/12/2021  Time:  7:50 AM     Procedure Summary     Date: 03/11/21 Room / Location: [facility] OR  / [facility]    Anesthesia Start:  Anesthesia Stop:     Procedure: RIGHT CARPAL TUNNEL RELEASE (Right ) Diagnosis: (RIGHT CARPAL TUNNEL SYNDROME)    Surgeons: Carlo Coelho MD Responsible Provider:     Anesthesia Type: MAC ASA Status: 2          Anesthesia Type: MAC    Marlon Phase I: Marlon Score: 10    Marlon Phase II: Marlon Score: 9    Last vitals: Reviewed and per EMR flowsheets.        Anesthesia Post Evaluation    Patient location during evaluation: PACU  Patient participation: complete - patient participated  Level of consciousness: awake  Pain score: 3  Airway patency: patent  Nausea & Vomiting: no nausea and no vomiting  Complications: no  Cardiovascular status: blood pressure returned to baseline  Respiratory status: acceptable  Hydration status: euvolemic

## 2021-03-11 NOTE — INTERVAL H&P NOTE
Update History & Physical    The patient's History and Physical of March 4, 2021 was reviewed with the patient and I examined the patient. There was no change. The surgical site was confirmed by the patient and me. Plan: The risks, benefits, expected outcome, and alternative to the recommended procedure have been discussed with the patient. Patient understands and wants to proceed with the procedure.      Electronically signed by Quinton Gonzalez MD on 3/11/2021 at 10:57 AM

## 2021-03-11 NOTE — PROGRESS NOTES
Discharge instructions given and reviewed with patient. Patient verbalizes understanding, no questions regarding care.

## 2021-03-11 NOTE — ANESTHESIA PRE PROCEDURE
Department of Anesthesiology  Preprocedure Note       Name:  Camille Carrizales   Age:  52 y.o.  :  1970                                          MRN:  [id number]         Date:  3/11/2021      Surgeon: Gregory Fisher):  Beth Tristan MD    Procedure: Procedure(s):  RIGHT CARPAL TUNNEL RELEASE    Medications prior to admission:   Prior to Admission medications    Medication Sig Start Date End Date Taking? Authorizing Provider   Multiple Vitamins-Minerals (THERAPEUTIC MULTIVITAMIN-MINERALS) tablet Take 1 tablet by mouth daily    Historical Provider, MD   Ascorbic Acid (VITAMIN C) 250 MG tablet Take 500 mg by mouth daily    Historical Provider, MD   acetaminophen (TYLENOL) 500 MG tablet Take 500 mg by mouth every 6 hours as needed for Pain    Historical Provider, MD   buprenorphine-naloxone (SUBOXONE) 8-2 MG FILM SL film Place under the tongue 2 times daily. Historical Provider, MD   ibuprofen (ADVIL;MOTRIN) 800 MG tablet Take 800 mg by mouth every 6 hours as needed for Pain    Historical Provider, MD   Temazepam (RESTORIL PO) Take by mouth as needed    Historical Provider, MD       Current medications:    No current facility-administered medications for this visit. No current outpatient medications on file.      Facility-Administered Medications Ordered in Other Visits   Medication Dose Route Frequency Provider Last Rate Last Admin    ceFAZolin (ANCEF) 2000 mg in sterile water 20 mL IV syringe  2,000 mg Intravenous On Call to 23 Btey Rogers PA-C        sodium chloride flush 0.9 % injection 10 mL  10 mL Intravenous 2 times per day Julito Hernandez PA-C        sodium chloride flush 0.9 % injection 10 mL  10 mL Intravenous PRN Julito Hernandez PA-C           Allergies:  No Known Allergies    Problem List:    Patient Active Problem List   Diagnosis Code    Forearm fractures, both bones, closed, right, initial encounter S52.91XA, S52.201A    Closed fracture of shaft of bone of right forearm S52. 91XA    Carpal tunnel syndrome of right wrist G56.01       Past Medical History:        Diagnosis Date    Arthritis     Bulging lumbar disc     Restless leg syndrome     Stress fracture        Past Surgical History:        Procedure Laterality Date    ARM SURGERY Right 10/17/2020    FOREARM OPEN REDUCTION INTERNAL FIXATION performed by Delwyn Sicard, MD at [address] [place] [address]      TONSILLECTOMY      TUBAL LIGATION         Social History:    Social History     Tobacco Use    Smoking status: Current Every Day Smoker     Packs/day: 1.00    Smokeless tobacco: Never Used    Tobacco comment: patient vapes   Substance Use Topics    Alcohol use: No                                Ready to quit: Not Answered  Counseling given: Not Answered  Comment: patient vapes      Vital Signs (Current): There were no vitals filed for this visit.                                            BP Readings from Last 3 Encounters:   03/11/21 108/67   12/01/20 114/75   11/02/20 (!) 145/77       NPO Status:  > 8 hours                                                                               BMI:   Wt Readings from Last 3 Encounters:   03/11/21 155 lb (70.3 kg)   02/16/21 150 lb (68 kg)   10/17/20 145 lb (65.8 kg)     There is no height or weight on file to calculate BMI.    CBC:   Lab Results   Component Value Date    WBC 7.4 03/11/2021    RBC 5.00 03/11/2021    HGB 13.0 03/11/2021    HCT 40.9 03/11/2021    MCV 81.8 03/11/2021    RDW 14.6 03/11/2021     03/11/2021       CMP:   Lab Results   Component Value Date     10/17/2020    K 3.5 10/17/2020     10/17/2020    CO2 20 10/17/2020    BUN 12 10/17/2020    CREATININE 0.6 10/17/2020    GFRAA >60 10/17/2020    LABGLOM >60 10/17/2020    GLUCOSE 102 10/17/2020    PROT 7.0 10/17/2020    CALCIUM 8.3 10/17/2020    BILITOT <0.2 10/17/2020    ALKPHOS 32 10/17/2020    AST 19 10/17/2020    ALT 12 10/17/2020 POC Tests: No results for input(s): POCGLU, POCNA, POCK, POCCL, POCBUN, POCHEMO, POCHCT in the last 72 hours. Coags:   Lab Results   Component Value Date    PROTIME 11.8 10/17/2020    INR 1.1 10/17/2020    APTT 29.4 10/17/2020       HCG (If Applicable):   Lab Results   Component Value Date    PREGTESTUR neg 03/13/2016        ABGs: No results found for: PHART, PO2ART, HTH5KZG, MUN5RYW, BEART, X5HUNHBR     Type & Screen (If Applicable):  No results found for: LABABO, LABRH    Drug/Infectious Status (If Applicable):  No results found for: HIV, HEPCAB    COVID-19 Screening (If Applicable):   Lab Results   Component Value Date    COVID19 Not Detected 03/05/2021        EKG 10/17/2020  Narrative & Impression   Normal sinus rhythm  Normal ECG  When compared with ECG of 13-OCT-2017 15:50,  No significant change was found       Anesthesia Evaluation  Patient summary reviewed and Nursing notes reviewed no history of anesthetic complications:   Airway: Mallampati: II  TM distance: >3 FB   Neck ROM: full  Mouth opening: > = 3 FB Dental:      Comment: Pt denies loose or chipped teeth    Pulmonary:normal exam  breath sounds clear to auscultation                            ROS comment: Vapes occasionally   Cardiovascular:Negative CV ROS          ECG reviewed  Rhythm: regular  Rate: normal           Beta Blocker:  Not on Beta Blocker         Neuro/Psych:   (+) neuromuscular disease:,              ROS comment: Restless leg syndrome  GI/Hepatic/Renal: Neg GI/Hepatic/Renal ROS            Endo/Other:    (+) : arthritis:., .                 Abdominal:           Vascular: negative vascular ROS. Anesthesia Plan      MAC     ASA 2       Induction: intravenous. Anesthetic plan and risks discussed with patient. Use of blood products discussed with patient whom. Plan discussed with CRNA.                   Hilary Lara MD   3/11/2021      Pt seen, examined, chart reviewed, plan discussed.   Rufus Lr  3/11/2021  10:44 AM

## 2021-03-24 ENCOUNTER — TELEPHONE (OUTPATIENT)
Dept: ORTHOPEDIC SURGERY | Age: 51
End: 2021-03-24

## 2021-03-24 NOTE — TELEPHONE ENCOUNTER
Devante Aviles from [employer] disability called office requesting the op note from 03/11/21. Disability form sent yesterday. Op note faxed at this time.

## 2021-03-30 ENCOUNTER — OFFICE VISIT (OUTPATIENT)
Dept: ORTHOPEDIC SURGERY | Age: 51
End: 2021-03-30
Payer: COMMERCIAL

## 2021-03-30 VITALS — TEMPERATURE: 97.6 F

## 2021-03-30 DIAGNOSIS — G56.01 CARPAL TUNNEL SYNDROME OF RIGHT WRIST: Primary | ICD-10-CM

## 2021-03-30 PROCEDURE — 99024 POSTOP FOLLOW-UP VISIT: CPT | Performed by: PHYSICIAN ASSISTANT

## 2021-03-30 PROCEDURE — 99212 OFFICE O/P EST SF 10 MIN: CPT | Performed by: PHYSICIAN ASSISTANT

## 2021-03-30 NOTE — PROGRESS NOTES
Camille Carrizales is a 52 y.o. female who presents for follow up of right wrist    SURGEON: Dr. Alexandr Falcon MD  Date of Injury/Surgery: 3/11/2021  Date last seen in office: 3/3/2021    Symptoms: better patient is only reporting 25% better  New complaints: N/A    Cast/Splint, Brace, or Dressings: Taken down for visit    Incision(s): Edges approximated no swelling sutures intact    Weightbearing: right upper Weight bearing as tolerated      Assistive device No Device  Participating in therapy (location if yes)?  no    Refills Needed: None  Order/Referral Needed: no

## 2021-03-31 NOTE — PROGRESS NOTES
Chief Complaint   Patient presents with    Follow Up After Procedure     S/P Right Carpal Tunnel Release on 3/11/2021        OP:SURGEON: Dr. Bel Arreola MD  DATE OF PROCEDURE: 3/11/21  PROCEDURE:R CTR    POD: 2 week    Subjective:  Vida Boateng is following up from the above surgery. She is PWB on right upper extremity this is the same side of her ORIF to BB forearm fracture 10/17/20. Pain to extremity is improving and is  taking prescribed pain medication, patient chronically on suboxone. Patient states the heaviness at the end of the day and the feeling like shes dropping things so far has improved since relief. Pins and needles sensation still present. Patient presents for suture removal. Patient inquiring about RTW, states she does not yet feel that she is ready so soon after CTR. Denies any fever or chills, denies chest pain or SOB. No other new orthopedic complaints today on exam .    Review of Systems -  All pertinent information per HPI    Objective:    General: Alert and oriented X 3, normocephalic atraumatic, external ears and eye normal, sclera clear, no acute distress, respirations easy and unlabored with no audible wheezes, skin warm and dry, speech and dress appropriate for noted age, affect euthymic. Extremity:  Right Upper Extremity  Skin is clean dry and intact without signs of breakdown or discoloration  No edema noted  2+ Radial pulse, fingers warm with BCR  Flex/extension intact to wrist, thumb and fingers   Finger opposition intact  Finger adduction/abduction intact  Finger crossover intact  able to make concentric fist  Continue to note atrophy to thenar eminence compared with contralateral side  Subjectively states sensation intact to median nerve distribution but somewhat diminished, sensation intact to ulnar and radial nerve distributions. Incision clean dry and intact without evidence of dehiscence, no surrounding erythema, warmth, or induration.  Sutures intact and ready for removal      Temp 97.6 °F (36.4 °C) (Oral)   LMP 06/10/2013         Assessment:   Diagnosis Orders   1. Carpal tunnel syndrome of right wrist         Plan:  Sutures removed  Start Scar massage  Gradually increase activity  RTO 2 weeks, discuss RTW    Electronically signed by Blaine Ivey PA-C on 3/31/2021 at 7:21 AM  Note: This report was completed using computerize voiced recognition software. Every effort has been made to ensure accuracy; however, inadvertent computerized transcription errors may be present.

## 2021-04-01 NOTE — PROGRESS NOTES
[facility]                Phone: [phone]  Fax: [phone]     Occupational Therapy  Out Patient Discharge Summary     Date:  2021    Patient Name:  Lianna Aguilar   :  1970 MRN: [id number]  Restrictions/Precautions:  Evaluate and treat 1-3 times per week for 6-8 weeks   NWB on RUE at this time ( since 2021 WBAT)  Gentle ROM and strengthening, digit motion, scar management, desensitization, edema control, HEP, modalities and manual therapy as needed , low fall risk  Diagnosis: S52. 91XD (ICD-10-CM) - Closed fracture of shaft of bone of right forearm with routine healing, subsequent encounter                                                    Insurance/Certification information: [facility]  Referring Practitioner: Dr. Arley Tracey  Date of Surgery/Injury: 10/17/2020   ( 19 weeks post op  )  Plan of care signed (Y/N): Y  Visit# / total visits:       FINAL STATUS:  · Pt progressed well towards goals however did not present for remainder of therapy sessions and has recently had a new surgical procedure. Discharge measurements were unable to be obtained and re-assessment measurements can be seen below. Pt met 3/ goals however was having continued numbness into the finger tips and difficulty with thumb motion. RUE Upper Extremity ROM        AROM [x]? ?     PROM[]?? IE  2021          ELBOW Flexion 0-150* 118* 140*          Extension 0* 26* 0*           FOREARM Pronation 80-90* 87* WFLs         Supination 80-90* 60* 70*           WRIST Flexion 0-70* 60*  64*         Extension 0-80* 30*  60*         Radial Deviation 0-20* 10*  25*          Ulnar Deviation 0-30* 30*  35*          Sensation: Pt demonstrates median nerve deficits.   Able To Sense (Y) / Unable to Sense (N)  SEMMES-THERON Thumb 2nd Digit 3rd Digit  4th Digit  5th Digit    Normal Touch  Size: 2.83             Diminished Light Touch   Size: 3.61  x  x - 75% of time x  x x   Diminished Protective Sense  Size: 4.31 x x x       Loss of Protective Sense   Size: 4.56             Loss of Sensation  Size: 6.65                Edema Description/Circumferential Measurements:              S elbow: 25 cm to 23.5 cm                                                                             L elbow: 23.5 cm              R forearm in Center:   21.7 cm  to 20.9 cm                                                    L forearm in the center: 20.5 cm              R wrist: 17 cm  to 15.3 cm                                                                              L wrist: 15.1 cm              S MCP's starting at base of 3rd digit:   18.5 cm                                              L MCP's: 18.5 cm   Dynamometer (setting 2):                              Left: TBA  to  79#                                           Right: TBA  to  55#  (some discomfort in thenar eminence)               Pinch Meter:              Lateral: Left= TBA to 15#, Right= TBA to 4#              Palmar 3 point: Left= TBA to 12.5#, Right= TBA to 6#  9 Hole Peg Test:              Left: TBA to 21 sec              Right: TBA to 23 sec (discomfort in FCR)     QuickDASH Score: 81.8% to 59.1% disability      GOALS:  3 out of 11 Goals were obtained/or reached maximum potential at this time. REASON FOR DISCHARGE: Pt underwent surgery and did not present for the remainder of her scheduled therapy sessions. PATIENT EDUCATION/INSTRUCTIONS: Pt was issued and education each session on HEP, edema management, pain management, and increasing ROM/endurance. RECOMMENDATIONS: Discharge from OT services due to  script and change in status. Thank you for the opportunity to work with your patient. If you have questions or comments, please feel free to contact us by phone or fax.     Electronically Signed by: [employer] Juan [employer], OTR/L #[id number]  2021

## 2021-04-06 ENCOUNTER — TELEPHONE (OUTPATIENT)
Dept: ORTHOPEDIC SURGERY | Age: 51
End: 2021-04-06

## 2021-04-06 NOTE — TELEPHONE ENCOUNTER
GAVE A NO RECORDS FOR DATE REQUESTED STATEMENT 0/26/2021 TO PRESENT TO JOSH PERZE TO SCAN IN MRO TO OOD

## 2021-04-13 ENCOUNTER — OFFICE VISIT (OUTPATIENT)
Dept: ORTHOPEDIC SURGERY | Age: 51
End: 2021-04-13
Payer: COMMERCIAL

## 2021-04-13 VITALS — TEMPERATURE: 98.4 F

## 2021-04-13 DIAGNOSIS — S52.91XA FOREARM FRACTURES, BOTH BONES, CLOSED, RIGHT, INITIAL ENCOUNTER: ICD-10-CM

## 2021-04-13 DIAGNOSIS — S52.91XD CLOSED FRACTURE OF SHAFT OF BONE OF RIGHT FOREARM WITH ROUTINE HEALING, SUBSEQUENT ENCOUNTER: ICD-10-CM

## 2021-04-13 DIAGNOSIS — G56.01 CARPAL TUNNEL SYNDROME OF RIGHT WRIST: Primary | ICD-10-CM

## 2021-04-13 DIAGNOSIS — S52.201A FOREARM FRACTURES, BOTH BONES, CLOSED, RIGHT, INITIAL ENCOUNTER: ICD-10-CM

## 2021-04-13 PROCEDURE — 99212 OFFICE O/P EST SF 10 MIN: CPT | Performed by: PHYSICIAN ASSISTANT

## 2021-04-13 PROCEDURE — 99024 POSTOP FOLLOW-UP VISIT: CPT | Performed by: PHYSICIAN ASSISTANT

## 2021-04-13 RX ORDER — BUPRENORPHINE HYDROCHLORIDE AND NALOXONE HYDROCHLORIDE DIHYDRATE 2; .5 MG/1; MG/1
TABLET SUBLINGUAL
COMMUNITY
Start: 2021-04-02

## 2021-04-13 NOTE — LETTER
[address]  [address] [facility] [address]  Phone: [phone]  Fax: Peggy 27 April 13, 2021     Patient: Eric Elliott   YOB: 1970   Date of Visit: 4/13/2021       To Whom It May Concern: It is my medical opinion that Eric Elliott can return to work 4-15-21 with restrictions including no heavy lifting, limited or no use of the torque gun and limitation working with small objects. If you have any questions or concerns, please don't hesitate to call.     Sincerely,        [facility]

## 2021-04-13 NOTE — PROGRESS NOTES
Chief Complaint   Patient presents with    Hand Injury      RIGHT CARPAL TUNNEL RELEASE DOS: 03/11/2021, HX OF FOREARM OPEN REDUCTION INTERNAL FIXATION, DOS:  10/17/2021       SUBJECTIVE: Merle Maria presents today for a follow-up visit from the 2 above surgical procedures. She states that she still has some numbness in the first 3 digits of her right hand. She denies neck pain or radiating upper extremity symptoms. Review of Systems -   General ROS: negative for - chills, fatigue, fever or night sweats  Respiratory ROS: no cough, shortness of breath, or wheezing  Cardiovascular ROS: no chest pain or dyspnea on exertion  Gastrointestinal ROS: no abdominal pain, change in bowel habits, or black or bloody stools  Genitourinary: no hematuria, dysuria, or incontinence   Musculoskeletal ROS:see above  Neurological ROS: no TIA or stroke symptoms       OBJECTIVE:   Alert and oriented X 3, no acute distress, respirations easy and unlabored with no audible wheezes, skin warm and dry, speech and dress appropriate for noted age, affect euthymic. Extremity:  Right Upper Extremity  Skin is clean dry and intact  no edema noted  Radial pulse palpable, fingers warm with BCR  Flex/extension intact to wrist, thumb and fingers  Finger opposition intact  Finger adduction/abduction intact  Finger crossover intact  Subjectively states sensation intact to radial/medial/ulnar distribution  Incision well approximated with no redness, drainage or warmth  Mildly decreased sensation to light touch in the right hand in the median nerve distribution    XR: 4/13/21  X-rays not taken at today's visit. Temp 98.4 °F (36.9 °C)   LMP 06/10/2013     ASSESSMENT:     Diagnosis Orders   1. Carpal tunnel syndrome of right wrist     2. Closed fracture of shaft of bone of right forearm with routine healing, subsequent encounter     3.   Forearm fractures, both bones, closed, right, subsequent encounter    PLAN:  I did discuss with the patient today that the nerve is still healing in her right hand and this can take a significant amount of time after surgery for the numbness to calm down. Also, again discussed possible hardware removal from the right forearm when she gets further out from the forearm surgery close to 1 year which she may be interested in. She was given a form to return to work on 4-15-21 with restrictions listed. Follow-up in the office in 4 to 6 weeks for reevaluation. Call with any questions or concerns. Electronically signed by SARAH BETH Philip on 4/13/2021 at 11:30 AM  Note: This report was completed using [employer] voiced recognition software. Every effort has been made to ensure accuracy; however, inadvertent computerized transcription errors may be present.

## 2021-04-13 NOTE — PROGRESS NOTES
Maegan Hurtado is a 52 y.o. female who presents for follow up of hands Right AND FOREARM RIGHT    SURGEON: Dr. Pallavi Orozco MD  Date of Injury/Surgery: 03/11/2021  Date last seen in office: 03/30/2021    Symptoms: unchanged  New complaints: PATIENT STATES SHE DOES NOT HAVE ALL THE FEELING IN HER 2,3,4 DIGITS, PATIENT STATES SHE ALWAYS HAS THE PINS AND NEEDLES FEELING; PATIENT DOES STATE THAT SINCE THE CARPAL TUNNEL SURGERY INSTEAD OF HAVING TO TAKE 2-3 DAYS OFF IN BETWEEN ACTIVITIES SHE IS ABLE TO DO THINGS EVERY DAY; PATIENT STATES HER ARM AND HAND DO NOT LIKE THE LAWNMOWER AT ALL; 0/10 PAIN      Weightbearing: right upper Full weight bearing      Assistive device No Device  Participating in therapy (location if yes)?  no    Refills Needed: None  Order/Referral Needed: no

## 2021-04-15 ENCOUNTER — TELEPHONE (OUTPATIENT)
Dept: ORTHOPEDIC SURGERY | Age: 51
End: 2021-04-15

## 2021-04-15 NOTE — TELEPHONE ENCOUNTER
Patient called office requesting letter to be rewritten, as they need more specific instructions. Fax to Marlena Montano [phone]    Letter written and faxed to  at this time.

## 2021-04-15 NOTE — LETTER
[address]  [address]  [place] [address]  Phone: [phone]  Fax: [phone]    Siomara Xiao MD    GM: [phone]    April 15, 2021     Patient: Courtney Angulo   YOB: 1970   Date of Visit: 4/15/2021       To Whom It May Concern: It is my medical opinion that Courtney Angulo can return to work 4-15-21 with restrictions including no heavy lifting greater than 10 lbs to right upper extremity, limited or no use of the torque gun and limitation working with grasping/gripping small objects.     If you have any questions or concerns, please don't hesitate to call.           Siomara Xiao MD

## 2021-05-12 ENCOUNTER — OFFICE VISIT (OUTPATIENT)
Dept: ORTHOPEDIC SURGERY | Age: 51
End: 2021-05-12
Payer: COMMERCIAL

## 2021-05-12 VITALS — TEMPERATURE: 98.4 F

## 2021-05-12 DIAGNOSIS — G56.01 CARPAL TUNNEL SYNDROME OF RIGHT WRIST: Primary | ICD-10-CM

## 2021-05-12 DIAGNOSIS — S52.91XD CLOSED FRACTURE OF SHAFT OF BONE OF RIGHT FOREARM WITH ROUTINE HEALING, SUBSEQUENT ENCOUNTER: ICD-10-CM

## 2021-05-12 PROCEDURE — 99024 POSTOP FOLLOW-UP VISIT: CPT | Performed by: PHYSICIAN ASSISTANT

## 2021-05-12 PROCEDURE — 99212 OFFICE O/P EST SF 10 MIN: CPT | Performed by: PHYSICIAN ASSISTANT

## 2021-05-12 NOTE — LETTER
[address] [facility] [address]  [address] [facility] [address]  Phone: [phone]  Fax: [phone]    MD Lit Wu PA-C      May 12, 2021     Patient: Genaro Montana   YOB: 1970   Date of Visit: 5/12/2021       To Whom It May Concern: It is my medical opinion that Genaro Montana may return to work at her next scheduled shift with only restriction of not using torque gun until evaluated in our office at her next appointment. If you have any questions or concerns, please don't hesitate to call.     Sincerely,          Lit Andersen PA-C

## 2021-05-16 NOTE — PROGRESS NOTES
Chris Correia is a 52 y.o. female who presents for follow up of right wrist and hand    SURGEON: Dr. Gerardo Mann MD  Date of Injury/Surgery: 3/11/2021  Date last seen in office: 4/13/2021    Symptoms: unchanged  New complaints: still having numbness in the thumb, pointer and middle fingers    Weightbearing: right upper Weight bearing as tolerated      Assistive device No Device  Participating in therapy (location if yes)?  no    Refills Needed: None  Order/Referral Needed: N/A
unlabored with no audible wheezes, skin warm and dry, speech and dress appropriate for noted age, affect euthymic. Extremity:  Right Upper Extremity  Skin is clean dry and intact without signs of breakdown  no edema noted  2+ Radial pulse, fingers warm with BCR  Flex/extension intact to wrist, thumb and fingers   Finger opposition intact  Finger adduction/abduction intact  Finger crossover intact  able to make concentric fist  Patient continues to have some weakness to AIN which has persisted since BB forearm ORIF  Subjectively states sensation diminished to median nerve but intact to Ulnar Nerve and Radial Nerve distribution  Incision(s) well healed without evidence of keloid. No surrounding signs of infection     Temp 98.4 °F (36.9 °C) (Oral)   LMP 06/10/2013       Assessment:   Diagnosis Orders   1. Carpal tunnel syndrome of right wrist     2. Closed fracture of shaft of bone of right forearm with routine healing, subsequent encounter         Plan:  Progress with activity as tolerated  Remember that numbness and tingling may not completely resolve but goal from release is to prevent it from worsening  Continue with strengthening and range of motion to maintain gains    Letter provided for work  Continue with ice/heat/elevation for soreness/swelling    Plan for follow up in 6 weeks, repeat xrays of the right forearm to assess fractures and if able to remove ulna plate. Electronically signed by Adan Claudio PA-C on 5/16/2021 at 7:45 PM  Note: This report was completed using [employer] voiced recognition software. Every effort has been made to ensure accuracy; however, inadvertent computerized transcription errors may be present.

## 2021-06-09 ENCOUNTER — OFFICE VISIT (OUTPATIENT)
Dept: ORTHOPEDIC SURGERY | Age: 51
End: 2021-06-09
Payer: COMMERCIAL

## 2021-06-09 VITALS — TEMPERATURE: 97.9 F

## 2021-06-09 DIAGNOSIS — G56.01 CARPAL TUNNEL SYNDROME OF RIGHT WRIST: Primary | ICD-10-CM

## 2021-06-09 DIAGNOSIS — S52.91XD CLOSED FRACTURE OF SHAFT OF BONE OF RIGHT FOREARM WITH ROUTINE HEALING, SUBSEQUENT ENCOUNTER: ICD-10-CM

## 2021-06-09 PROCEDURE — 99212 OFFICE O/P EST SF 10 MIN: CPT | Performed by: ORTHOPAEDIC SURGERY

## 2021-06-09 PROCEDURE — 99024 POSTOP FOLLOW-UP VISIT: CPT | Performed by: ORTHOPAEDIC SURGERY

## 2021-06-09 NOTE — PROGRESS NOTES
intact to light touch in the Median Nerve, Ulnar Nerve and Radial Nerve distribution  Incision(s) well healed  Temp 97.9 °F (36.6 °C) (Oral)   LMP 06/10/2013     XR:   Not taken at today's visit. Assessment:   Diagnosis Orders   1. Carpal tunnel syndrome of right wrist     2. Closed fracture of shaft of bone of right forearm with routine healing, subsequent encounter         Plan:  Reviewed x-rays with patient today in office     Continue with activities as tolerated. We did give her a form to return to work full duty. We did discuss getting right forearm x-rays during her visit today however she states that she needs to leave so we will schedule her for follow-up in 2 months for evaluation and to get x-rays on her right forearm. Again discussed possible removal of the ulna plate if she is having symptoms. She will be seen as needed regarding the carpal tunnel release. Call if any questions or concerns. Electronically signed by SARAH BETH Jordan on 6/9/2021 at 11:32 AM  Note: This report was completed using [employer] voiced recognition software. Every effort has been made to ensure accuracy; however, inadvertent computerized transcription errors may be present.

## 2021-06-09 NOTE — LETTER
[address]  [address] [facility] [address]  Phone: [phone]  Fax: [phone]          June 9, 2021     Patient: Rudene Mortimer   YOB: 1970   Date of Visit: 6/9/2021       To Whom It May Concern: It is my medical opinion that Rudene Mortimer may return to full duty immediately with no restrictions on 6-14-21. If you have any questions or concerns, please don't hesitate to call.     Sincerely,        SARAH BETH Ryan

## (undated) DEVICE — ELECTROSURGICAL PENCIL BUTTON SWITCH E-Z CLEAN COATED BLADE ELECTRODE 10 FT (3 M) CORD HOLSTER: Brand: MEGADYNE

## (undated) DEVICE — SET ORTHO STD STORTSTD2

## (undated) DEVICE — SURGICAL PROCEDURE PACK HND

## (undated) DEVICE — 3M™ STERI-DRAPE™ U-DRAPE 1015: Brand: STERI-DRAPE™

## (undated) DEVICE — GLOVE ORTHO 8   MSG9480

## (undated) DEVICE — Z DISCONTINUED USE 2275686 GLOVE SURG SZ 8 L12IN FNGR THK13MIL WHT ISOLEX POLYISOPRENE

## (undated) DEVICE — ZIMMER® STERILE DISPOSABLE TOURNIQUET CUFF WITH PROTECTIVE SLEEVE AND PLC, DUAL PORT, SINGLE BLADDER, 18 IN. (46 CM)

## (undated) DEVICE — DRILL SYSTEM 7

## (undated) DEVICE — BIT DRL L140MM DIA2MM QUIK CPL 3 FLUT CALIB DEPTH MRK W/O

## (undated) DEVICE — CLOTH SURG PREP PREOPERATIVE CHLORHEXIDINE GLUC 2% READYPREP

## (undated) DEVICE — GOWN,BREATHABLE SLV,AURORA,XLG,STRL: Brand: MEDLINE

## (undated) DEVICE — DOUBLE BASIN SET: Brand: MEDLINE INDUSTRIES, INC.

## (undated) DEVICE — GLOVE SURG SZ 8 L12IN FNGR THK79MIL GRN LTX FREE

## (undated) DEVICE — INTENDED FOR TISSUE SEPARATION, AND OTHER PROCEDURES THAT REQUIRE A SHARP SURGICAL BLADE TO PUNCTURE OR CUT.: Brand: BARD-PARKER ® STAINLESS STEEL BLADES

## (undated) DEVICE — DRAPE,U/ SHT,SPLIT,PLAS,STERIL: Brand: MEDLINE

## (undated) DEVICE — GOWN,AURORA,BRTHSLV,2XL,18/CS: Brand: MEDLINE

## (undated) DEVICE — DRIP REDUCTION MANIFOLD

## (undated) DEVICE — DRAPE C ARM W41XL74IN UNIV MOB W RUBBERBAND CLP

## (undated) DEVICE — SET ORTHO STD STORTSTD1

## (undated) DEVICE — PAD,ABDOMINAL,5"X9",ST,LF,25/BX: Brand: MEDLINE INDUSTRIES, INC.

## (undated) DEVICE — BIT DRL L96MM DIA1.5MM MINI QUIK CPL CALIB W/O STP REUSE

## (undated) DEVICE — BIT DRL L65MM DIA2MM MINI S STL QUIK CPL W/O STP REUSE FOR

## (undated) DEVICE — PATIENT RETURN ELECTRODE, SINGLE-USE, CONTACT QUALITY MONITORING, ADULT, WITH 9FT CORD, FOR PATIENTS WEIGING OVER 33LBS. (15KG): Brand: MEGADYNE

## (undated) DEVICE — TOWEL,OR,DSP,ST,BLUE,DLX,10/PK,8PK/CS: Brand: MEDLINE

## (undated) DEVICE — PADDING,UNDERCAST,COTTON, 4"X4YD STERILE: Brand: MEDLINE

## (undated) DEVICE — GOWN,BREATHABLE,IMP SLV 3XL AURORA: Brand: MEDLINE